# Patient Record
Sex: FEMALE | Race: ASIAN | NOT HISPANIC OR LATINO | URBAN - METROPOLITAN AREA
[De-identification: names, ages, dates, MRNs, and addresses within clinical notes are randomized per-mention and may not be internally consistent; named-entity substitution may affect disease eponyms.]

---

## 2019-10-23 ENCOUNTER — INPATIENT (INPATIENT)
Facility: HOSPITAL | Age: 59
LOS: 5 days | Discharge: HOME | End: 2019-10-29
Attending: HOSPITALIST | Admitting: HOSPITALIST
Payer: MEDICAID

## 2019-10-23 VITALS
OXYGEN SATURATION: 100 % | TEMPERATURE: 97 F | HEART RATE: 100 BPM | DIASTOLIC BLOOD PRESSURE: 62 MMHG | RESPIRATION RATE: 18 BRPM | SYSTOLIC BLOOD PRESSURE: 183 MMHG

## 2019-10-23 DIAGNOSIS — I50.33 ACUTE ON CHRONIC DIASTOLIC (CONGESTIVE) HEART FAILURE: ICD-10-CM

## 2019-10-23 DIAGNOSIS — R07.89 OTHER CHEST PAIN: ICD-10-CM

## 2019-10-23 DIAGNOSIS — Z98.890 OTHER SPECIFIED POSTPROCEDURAL STATES: Chronic | ICD-10-CM

## 2019-10-23 DIAGNOSIS — M79.602 PAIN IN LEFT ARM: ICD-10-CM

## 2019-10-23 DIAGNOSIS — I10 ESSENTIAL (PRIMARY) HYPERTENSION: ICD-10-CM

## 2019-10-23 DIAGNOSIS — I25.10 ATHEROSCLEROTIC HEART DISEASE OF NATIVE CORONARY ARTERY WITHOUT ANGINA PECTORIS: ICD-10-CM

## 2019-10-23 DIAGNOSIS — Z29.9 ENCOUNTER FOR PROPHYLACTIC MEASURES, UNSPECIFIED: ICD-10-CM

## 2019-10-23 DIAGNOSIS — E78.5 HYPERLIPIDEMIA, UNSPECIFIED: ICD-10-CM

## 2019-10-23 DIAGNOSIS — R07.9 CHEST PAIN, UNSPECIFIED: ICD-10-CM

## 2019-10-23 DIAGNOSIS — Z90.49 ACQUIRED ABSENCE OF OTHER SPECIFIED PARTS OF DIGESTIVE TRACT: Chronic | ICD-10-CM

## 2019-10-23 LAB
ALBUMIN SERPL ELPH-MCNC: 4.5 G/DL — SIGNIFICANT CHANGE UP (ref 3.3–5)
ALP SERPL-CCNC: 108 U/L — SIGNIFICANT CHANGE UP (ref 40–120)
ALT FLD-CCNC: 24 U/L — SIGNIFICANT CHANGE UP (ref 4–33)
ANION GAP SERPL CALC-SCNC: 14 MMO/L — SIGNIFICANT CHANGE UP (ref 7–14)
ANION GAP SERPL CALC-SCNC: 16 MMO/L — HIGH (ref 7–14)
APTT BLD: 45.9 SEC — HIGH (ref 27.5–36.3)
AST SERPL-CCNC: 28 U/L — SIGNIFICANT CHANGE UP (ref 4–32)
BASOPHILS # BLD AUTO: 0.05 K/UL — SIGNIFICANT CHANGE UP (ref 0–0.2)
BASOPHILS NFR BLD AUTO: 0.4 % — SIGNIFICANT CHANGE UP (ref 0–2)
BILIRUB SERPL-MCNC: 0.3 MG/DL — SIGNIFICANT CHANGE UP (ref 0.2–1.2)
BUN SERPL-MCNC: 12 MG/DL — SIGNIFICANT CHANGE UP (ref 7–23)
BUN SERPL-MCNC: 12 MG/DL — SIGNIFICANT CHANGE UP (ref 7–23)
CALCIUM SERPL-MCNC: 10.1 MG/DL — SIGNIFICANT CHANGE UP (ref 8.4–10.5)
CALCIUM SERPL-MCNC: 10.3 MG/DL — SIGNIFICANT CHANGE UP (ref 8.4–10.5)
CHLORIDE SERPL-SCNC: 103 MMOL/L — SIGNIFICANT CHANGE UP (ref 98–107)
CHLORIDE SERPL-SCNC: 103 MMOL/L — SIGNIFICANT CHANGE UP (ref 98–107)
CHOLEST SERPL-MCNC: 167 MG/DL — SIGNIFICANT CHANGE UP (ref 120–199)
CO2 SERPL-SCNC: 18 MMOL/L — LOW (ref 22–31)
CO2 SERPL-SCNC: 26 MMOL/L — SIGNIFICANT CHANGE UP (ref 22–31)
CREAT SERPL-MCNC: 0.78 MG/DL — SIGNIFICANT CHANGE UP (ref 0.5–1.3)
CREAT SERPL-MCNC: 0.81 MG/DL — SIGNIFICANT CHANGE UP (ref 0.5–1.3)
EOSINOPHIL # BLD AUTO: 0.32 K/UL — SIGNIFICANT CHANGE UP (ref 0–0.5)
EOSINOPHIL NFR BLD AUTO: 2.4 % — SIGNIFICANT CHANGE UP (ref 0–6)
GLUCOSE SERPL-MCNC: 117 MG/DL — HIGH (ref 70–99)
GLUCOSE SERPL-MCNC: 123 MG/DL — HIGH (ref 70–99)
HCT VFR BLD CALC: 45.8 % — HIGH (ref 34.5–45)
HDLC SERPL-MCNC: 43 MG/DL — LOW (ref 45–65)
HGB BLD-MCNC: 15.1 G/DL — SIGNIFICANT CHANGE UP (ref 11.5–15.5)
IMM GRANULOCYTES NFR BLD AUTO: 0.8 % — SIGNIFICANT CHANGE UP (ref 0–1.5)
INR BLD: 1 — SIGNIFICANT CHANGE UP (ref 0.88–1.17)
LIPID PNL WITH DIRECT LDL SERPL: 104 MG/DL — SIGNIFICANT CHANGE UP
LYMPHOCYTES # BLD AUTO: 24.6 % — SIGNIFICANT CHANGE UP (ref 13–44)
LYMPHOCYTES # BLD AUTO: 3.32 K/UL — HIGH (ref 1–3.3)
MAGNESIUM SERPL-MCNC: 1.9 MG/DL — SIGNIFICANT CHANGE UP (ref 1.6–2.6)
MCHC RBC-ENTMCNC: 29.8 PG — SIGNIFICANT CHANGE UP (ref 27–34)
MCHC RBC-ENTMCNC: 33 % — SIGNIFICANT CHANGE UP (ref 32–36)
MCV RBC AUTO: 90.5 FL — SIGNIFICANT CHANGE UP (ref 80–100)
MONOCYTES # BLD AUTO: 1.08 K/UL — HIGH (ref 0–0.9)
MONOCYTES NFR BLD AUTO: 8 % — SIGNIFICANT CHANGE UP (ref 2–14)
NEUTROPHILS # BLD AUTO: 8.61 K/UL — HIGH (ref 1.8–7.4)
NEUTROPHILS NFR BLD AUTO: 63.8 % — SIGNIFICANT CHANGE UP (ref 43–77)
NRBC # FLD: 0 K/UL — SIGNIFICANT CHANGE UP (ref 0–0)
PHOSPHATE SERPL-MCNC: 3 MG/DL — SIGNIFICANT CHANGE UP (ref 2.5–4.5)
PLATELET # BLD AUTO: 254 K/UL — SIGNIFICANT CHANGE UP (ref 150–400)
PMV BLD: 11.1 FL — SIGNIFICANT CHANGE UP (ref 7–13)
POTASSIUM SERPL-MCNC: 4.1 MMOL/L — SIGNIFICANT CHANGE UP (ref 3.5–5.3)
POTASSIUM SERPL-MCNC: 4.7 MMOL/L — SIGNIFICANT CHANGE UP (ref 3.5–5.3)
POTASSIUM SERPL-SCNC: 4.1 MMOL/L — SIGNIFICANT CHANGE UP (ref 3.5–5.3)
POTASSIUM SERPL-SCNC: 4.7 MMOL/L — SIGNIFICANT CHANGE UP (ref 3.5–5.3)
PROT SERPL-MCNC: 8.4 G/DL — HIGH (ref 6–8.3)
PROTHROM AB SERPL-ACNC: 11.4 SEC — SIGNIFICANT CHANGE UP (ref 9.8–13.1)
RBC # BLD: 5.06 M/UL — SIGNIFICANT CHANGE UP (ref 3.8–5.2)
RBC # FLD: 13.2 % — SIGNIFICANT CHANGE UP (ref 10.3–14.5)
SODIUM SERPL-SCNC: 137 MMOL/L — SIGNIFICANT CHANGE UP (ref 135–145)
SODIUM SERPL-SCNC: 143 MMOL/L — SIGNIFICANT CHANGE UP (ref 135–145)
TRIGL SERPL-MCNC: 173 MG/DL — HIGH (ref 10–149)
TROPONIN T, HIGH SENSITIVITY: 12 NG/L — SIGNIFICANT CHANGE UP (ref ?–14)
TROPONIN T, HIGH SENSITIVITY: 13 NG/L — SIGNIFICANT CHANGE UP (ref ?–14)
WBC # BLD: 13.49 K/UL — HIGH (ref 3.8–10.5)
WBC # FLD AUTO: 13.49 K/UL — HIGH (ref 3.8–10.5)

## 2019-10-23 PROCEDURE — 71046 X-RAY EXAM CHEST 2 VIEWS: CPT | Mod: 26

## 2019-10-23 PROCEDURE — 93010 ELECTROCARDIOGRAM REPORT: CPT

## 2019-10-23 PROCEDURE — 93571 IV DOP VEL&/PRESS C FLO 1ST: CPT | Mod: 26,LC

## 2019-10-23 PROCEDURE — 99233 SBSQ HOSP IP/OBS HIGH 50: CPT

## 2019-10-23 PROCEDURE — 99152 MOD SED SAME PHYS/QHP 5/>YRS: CPT

## 2019-10-23 PROCEDURE — 93458 L HRT ARTERY/VENTRICLE ANGIO: CPT | Mod: 26

## 2019-10-23 RX ORDER — CLONAZEPAM 1 MG
1 TABLET ORAL
Qty: 0 | Refills: 0 | DISCHARGE

## 2019-10-23 RX ORDER — ASPIRIN/CALCIUM CARB/MAGNESIUM 324 MG
162 TABLET ORAL ONCE
Refills: 0 | Status: COMPLETED | OUTPATIENT
Start: 2019-10-23 | End: 2019-10-23

## 2019-10-23 RX ORDER — ACETAMINOPHEN 500 MG
650 TABLET ORAL EVERY 6 HOURS
Refills: 0 | Status: DISCONTINUED | OUTPATIENT
Start: 2019-10-23 | End: 2019-10-29

## 2019-10-23 RX ORDER — ASPIRIN/CALCIUM CARB/MAGNESIUM 324 MG
1 TABLET ORAL
Qty: 0 | Refills: 0 | DISCHARGE

## 2019-10-23 RX ORDER — INFLUENZA VIRUS VACCINE 15; 15; 15; 15 UG/.5ML; UG/.5ML; UG/.5ML; UG/.5ML
0.5 SUSPENSION INTRAMUSCULAR ONCE
Refills: 0 | Status: DISCONTINUED | OUTPATIENT
Start: 2019-10-23 | End: 2019-10-29

## 2019-10-23 RX ORDER — ATORVASTATIN CALCIUM 80 MG/1
20 TABLET, FILM COATED ORAL AT BEDTIME
Refills: 0 | Status: DISCONTINUED | OUTPATIENT
Start: 2019-10-23 | End: 2019-10-24

## 2019-10-23 RX ORDER — ASPIRIN/CALCIUM CARB/MAGNESIUM 324 MG
81 TABLET ORAL DAILY
Refills: 0 | Status: DISCONTINUED | OUTPATIENT
Start: 2019-10-24 | End: 2019-10-29

## 2019-10-23 RX ORDER — TRIAMTERENE/HYDROCHLOROTHIAZID 75 MG-50MG
1 TABLET ORAL DAILY
Refills: 0 | Status: DISCONTINUED | OUTPATIENT
Start: 2019-10-23 | End: 2019-10-29

## 2019-10-23 RX ORDER — CLONAZEPAM 1 MG
0.5 TABLET ORAL AT BEDTIME
Refills: 0 | Status: DISCONTINUED | OUTPATIENT
Start: 2019-10-23 | End: 2019-10-29

## 2019-10-23 RX ADMIN — Medication 0.5 MILLIGRAM(S): at 22:59

## 2019-10-23 RX ADMIN — ATORVASTATIN CALCIUM 20 MILLIGRAM(S): 80 TABLET, FILM COATED ORAL at 22:59

## 2019-10-23 RX ADMIN — Medication 162 MILLIGRAM(S): at 14:37

## 2019-10-23 RX ADMIN — Medication 650 MILLIGRAM(S): at 23:15

## 2019-10-23 RX ADMIN — Medication 650 MILLIGRAM(S): at 22:26

## 2019-10-23 NOTE — H&P ADULT - PROBLEM SELECTOR PLAN 5
ECHO in January 2019= mild diastolic dysfunction  Continue outpatient medications ASA  Strict I&Os, monitor weight DVT PPX  Pt on ASA for CAD, and diastolic CHF  DASH Diet  Continue clonazepam for insomnia

## 2019-10-23 NOTE — ED PROVIDER NOTE - PROGRESS NOTE DETAILS
PHANI Harper: Spoke with , the fellow will be evaluating the patient PHANI Harper: pt seen by EP, plan to take her to cath lab, no further medications need to be adminstered in the ED, admit to  PHANI Harper: pt seen by EP, plan to take her to cath lab, no further medications need to be administered in the ED, admit to

## 2019-10-23 NOTE — ED PROVIDER NOTE - ATTENDING CONTRIBUTION TO CARE
Patient presents to the ed with cp and abnl stress. Patient developed cp few d ago, left side, radiated to lue, tightness, nonexertional, lasted few hours/resolved. Went to md who did stress test and was found to be abnormal with possible runs of v-tach per paperwork. Patient was told to go to ed yesterday but was nervous so waited until today. Patient denies any symptoms at this time, feels at her baseline soh. No fevers, chills, ha, sob, abd pain, vomiting, diarrhea, dysuria, le edema, hormone use. Has 6 siblings with h/o mi.   exam  GEN - NAD; well appearing; A+O x3   HEAD - NC/AT   EYES- PERRL, EOMI  ENT: Airway patent, mmm, Oral cavity and pharynx normal.    NECK: Neck supple  PULMONARY - CTA b/l, symmetric breath sounds.   CARDIAC -s1s2, RRR, no M,G,R  ABDOMEN - +BS, ND, NT, soft, no guarding  BACK - no CVA tenderness, Normal  spine   EXTREMITIES - FROM, no edema   SKIN - no rash or bruising   NEUROLOGIC - alert, speech clear, no focal deficits  PSYCH -nl mood/affect, nl insight.  a/p-patient presents with ep of chest pain, found to have abnl stress as outpatient, no symptoms in ed, vss, plan for labs to eval for acs, ekg reviewed with no acute ischemia, cardiology called, admit for further management.

## 2019-10-23 NOTE — H&P ADULT - NEGATIVE GENERAL SYMPTOMS
no malaise/no polyphagia/no polyuria/no polydipsia/no fever/no fatigue/no weight loss/no chills/no sweating/no anorexia

## 2019-10-23 NOTE — ED PROVIDER NOTE - CLINICAL SUMMARY MEDICAL DECISION MAKING FREE TEXT BOX
59yF w/pmhx HTN?, HLD sent to ED with left sided chest pain and positive stress test. Pt with script to call , will discuss case with him. Will check EKG, CXR, cbc/cmp/trop, ASA. Will monitor on tele

## 2019-10-23 NOTE — H&P ADULT - NEGATIVE CARDIOVASCULAR SYMPTOMS
no paroxysmal nocturnal dyspnea/no chest pain/no peripheral edema/no palpitations/no dyspnea on exertion/no orthopnea/no claudication

## 2019-10-23 NOTE — H&P ADULT - NSICDXFAMILYHX_GEN_ALL_CORE_FT
FAMILY HISTORY:  Family history of myocardial infarction,   in ; Sister  in ; 6 brother  of MI around ages 45-49 y/o

## 2019-10-23 NOTE — H&P ADULT - NEGATIVE OPHTHALMOLOGIC SYMPTOMS
no diplopia/no photophobia/no blurred vision R/no discharge R/no pain L/no irritation L/no irritation R/no loss of vision R/no lacrimation L/no lacrimation R/no discharge L/no blurred vision L/no scleral injection R/no pain R/no loss of vision L/no scleral injection L

## 2019-10-23 NOTE — H&P ADULT - RS GEN PE MLT RESP DETAILS PC
no intercostal retractions/no rales/airway patent/respirations non-labored/normal/no wheezes/no chest wall tenderness/breath sounds equal/good air movement/no rhonchi/no subcutaneous emphysema/clear to auscultation bilaterally

## 2019-10-23 NOTE — H&P ADULT - NSICDXPASTSURGICALHX_GEN_ALL_CORE_FT
PAST SURGICAL HISTORY:  H/O eye surgery     S/P cholecystectomy PAST SURGICAL HISTORY:  H/O eye surgery Left eye for cataracts; July 2019    S/P cholecystectomy 20 years ago

## 2019-10-23 NOTE — H&P ADULT - MUSCULOSKELETAL
detailed exam diminished strength/normal/no joint swelling/no joint erythema/no joint warmth/no calf tenderness/ROM intact/diminished strength in Left arm and hand details… no joint swelling/no joint erythema/ROM intact/normal/no joint warmth/no calf tenderness

## 2019-10-23 NOTE — H&P ADULT - NEUROLOGICAL DETAILS
responds to pain/normal strength/alert and oriented x 3/cranial nerves intact/no spontaneous movement/superficial reflexes intact/responds to verbal commands/sensation intact/deep reflexes intact

## 2019-10-23 NOTE — H&P ADULT - NEGATIVE GASTROINTESTINAL SYMPTOMS
no abdominal pain/no melena/no vomiting/no constipation/no diarrhea/no change in bowel habits/no flatulence/no steatorrhea/no jaundice/no hematochezia/no hiccoughs/no nausea

## 2019-10-23 NOTE — H&P ADULT - PROBLEM SELECTOR PLAN 1
Telemetry monitor cardiac catheterization with coronary angiogram  CE x 3, telemetry , serial EKG's   Unstable angina   Serial EKG's  Serial CE: Troponin I, CK, CKMB, Pro-BNP  Continue ASA. O2. consider BB/imdur  case dw Dr Vo and cardiology fellow

## 2019-10-23 NOTE — H&P ADULT - ASSESSMENT
60 y/o female with PMHx of HTN, HLD, diastolic CHF and non-obstructive CAD, p/w left sided chest heaviness and Lt arm discomfort., admitted to telemetry for a CTA to R/O ACS.     EKG: NSR @ 106BPM; LVH

## 2019-10-23 NOTE — ED ADULT NURSE NOTE - OBJECTIVE STATEMENT
Pt rec'd in 23, brought in by niece who is translating for pt per request (Sinhala-speaking). Pt is visiting from [facility], [place] ago, c/o left sided chest and left arm pain x 2 days. Pt was taken to an outpatient doctor who did an EKG at rest and EKG while jogging. Pt became dizzy and had runs of V-tach. Pt was told to come to ED, but didn't come yesterday because she felt scared. Pt with history of HTN, DM, and high cholesterol, denies personal cardiac history, but has significant family history (multiple brothers dead from heart attacks). Pt reports feeling dizzy when she walks around. Reports mild chest pain at this time, appears in NAD.

## 2019-10-23 NOTE — CHART NOTE - NSCHARTNOTEFT_GEN_A_CORE
Right radial wrist band removed. Hemostasis achieved. Site soft without hematoma or bleeding. DSD applied. Pulses positive. Pt hemodynamically stable. Monitoring of site discussed with patient, family and RN.

## 2019-10-23 NOTE — H&P ADULT - NEGATIVE ENMT SYMPTOMS
no hearing difficulty/no ear pain/no sinus symptoms/no nasal congestion/no nose bleeds/no recurrent cold sores/no dry mouth/no vertigo/no nasal discharge/no tinnitus/no throat pain/no dysphagia/no post-nasal discharge/no gum bleeding/no abnormal taste sensation/no nasal obstruction

## 2019-10-23 NOTE — H&P ADULT - NEGATIVE PSYCHIATRIC SYMPTOMS
no memory loss/no mood swings/no hyperactivity/no suicidal ideation/no agitation/no auditory hallucinations/no depression/no anxiety/no paranoia/no visual hallucinations

## 2019-10-23 NOTE — H&P ADULT - NEGATIVE NEUROLOGICAL SYMPTOMS
no focal seizures/no loss of sensation/no difficulty walking/no facial palsy/no transient paralysis/no generalized seizures/no loss of consciousness/no hemiparesis/no headache/no confusion/no vertigo/no weakness/no syncope/no tremors/no paresthesias

## 2019-10-23 NOTE — H&P ADULT - NSHPSOCIALHISTORY_GEN_ALL_CORE
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 [place]. Pt is ,  passed away in 2009 of an MI. Pt denies tobacco, EtOH, and illicit drug use at this time.

## 2019-10-23 NOTE — ED ADULT NURSE NOTE - CHIEF COMPLAINT QUOTE
Pt visiting from [facility] c/o Lt sided chest pain, intermittent, starting 2 days ago radiating to her Lt arm, pt had EKG done yesterday and was told to come to ED for evaluation of v. tach and to have a CTA pt was nervous and did not seek medical treatment, pt denies headache/dizziness, no sob, reports discomfort to lt arm.

## 2019-10-23 NOTE — H&P ADULT - NEGATIVE MUSCULOSKELETAL SYMPTOMS
no muscle cramps/no joint swelling/no myalgia/no arthralgia/no arthritis/no back pain/no leg pain L/no leg pain R/no stiffness/no arm pain R

## 2019-10-23 NOTE — ED ADULT TRIAGE NOTE - CHIEF COMPLAINT QUOTE
Pt visiting from [facility] c/o Lt sided chest pain, intermittent, starting 2 days ago radiating to her Lt arm, pt had EKG done yesterday and was told to come to ED for evaluation of v. tach and to have a CTA pt was nervous and did not seek medical treatment, pt denies headache/dizziness, no sob, reports discomfort to lt arm.

## 2019-10-23 NOTE — H&P ADULT - HISTORY OF PRESENT ILLNESS
NO chest pain, SOB, POOL, PND, orthopnea, palpitations, diaphoresis, lightheadedness, dizziness, syncope, increased lower extremity edema, fever chills, malaise, myalgias, anorexia, weight changes ( loss or gain), night sweats, generalized fatigue abdominal pain, N/V/C/D BRBPR, melena, urinary symptoms, cough, and wheezing. 60 y/o female with PMHx of HTN, HLD, diastolic CHF and non-obstructive CAD, p/w left sided chest heaviness and Lt arm discomfort. Pt states she arrived from [facility] 6 days ago. Pt reports left sided chest/shoulder heaviness with radiation to the left arm, non-exertional, non-positional, and not reproducible at this time. Pt reveals she went to the doctor yesterday and had a stress test, which came back positive for runs of ventricular tachycardia. Pt reports increased left arm pain x 1 wk, and weakness in her left hand. Pt describes the left arm discomfort as tightness and weakness down to her fingertips, and rates the pain a 4/10 at this time. Pt reports the left chest heaviness and left arm pain comes and goes and keeps her up at night sometimes d/t the discomfort. Pt endorses the left arm pain is worse with movement and exertion, but denies pain with palpation. Pt denies numbness and tingling in the left arm at this time. Pt endorses unintentional weight gain, around 20 lbs within the last year. Pt denies chest pain, SOB, POOL, PND, orthopnea, palpitations, diaphoresis, lightheadedness, dizziness, syncope, LE edema, fever/chills, malaise, myalgias, anorexia, night sweats, generalized fatigue, abdominal pain, N/V/C/D, BRBPR, melena, urinary symptoms, cough, and wheezing at this time. Denies prolonged bed rest, and recent sick contacts at this time. 60 y/o female with PMHx of HTN, HLD, diastolic CHF and non-obstructive CAD, p/w left sided chest heaviness and Lt arm discomfort. Pt states she arrived from [facility] 6 days ago. Pt reports left sided chest/shoulder heaviness 3/10 with radiation to the left arm, non-exertional, non-positional, and not reproducible at this time lasting a few seconds. Pt reveals she went to the doctor yesterday and had a stress test, which came back positive for runs of ventricular tachycardia. Pt reports increased left arm pain x 1 wk, and weakness in her left hand. Pt describes the left arm discomfort as tightness and weakness down to her fingertips, and rates the pain a 4/10 at this time. Pt reports the left chest heaviness and left arm pain comes and goes and keeps her up at night sometimes d/t the discomfort. Pt endorses the left arm pain is worse with movement and exertion, but denies pain with palpation. Pt denies numbness and tingling in the left arm at this time. Pt endorses unintentional weight gain, around 20 lbs within the last year. Pt denies chest pain, SOB, POOL, PND, orthopnea, palpitations, diaphoresis, lightheadedness, dizziness, syncope, LE edema, fever/chills, malaise, myalgias, anorexia, night sweats, generalized fatigue, abdominal pain, N/V/C/D, BRBPR, melena, urinary symptoms, cough, and wheezing at this time. Denies prolonged bed rest, and recent sick contacts at this time.

## 2019-10-23 NOTE — H&P ADULT - GASTROINTESTINAL DETAILS
no rebound tenderness/no rigidity/no organomegaly/soft/no masses palpable/no guarding/normal/bowel sounds normal/no bruit/nontender/no distention

## 2019-10-23 NOTE — H&P ADULT - PROBLEM SELECTOR PLAN 2
CTA  Serial EKG's  Serial CE: Troponin I, CK, CKMB, Pro-BNP  Continue ASA Monitor BP regularly  low salt, low fat, low cholesterol   continue triameterene HCTZ

## 2019-10-23 NOTE — H&P ADULT - NSHPPOAURINARYCATHETER_GEN_ALL_CORE
"[facility]  [address] [facility]  [address]  [place]  [address]  Tel: [phone]    June 19, 2017    Lima Bueno  [address]  [place] [address]        Dear Ms. Chris Bueno,    We missed doing your Asthma Control Test when you were in for your recent appointment.     Please complete the enclosed \"ACT\"and either     mail back to us  or  fax [phone] back to us  or  call [phone] us with your answers (ask for any of the nurses/ med assist).     Due to copyright laws we are unable to ask you the questions over the phone without the form visible to you.         Sincerely,    Nereyda ALVAREZ MA on behalf of Oscar Baltazar MD        Enc: ACT form      "
no

## 2019-10-23 NOTE — H&P ADULT - NEGATIVE GENERAL GENITOURINARY SYMPTOMS
no renal colic/no hematuria/no flank pain R/no urine discoloration/no dysuria/no incontinence/no urinary hesitancy/no nocturia/no flank pain L/normal libido/no gas in urine/normal urinary frequency/no bladder infections

## 2019-10-23 NOTE — CONSULT NOTE ADULT - ATTENDING COMMENTS
59F with HTN, HLD presents for wide complex tachycardia during stress test. She is here for cor angiogram and further discussion of possible EP testing.

## 2019-10-23 NOTE — CONSULT NOTE ADULT - SUBJECTIVE AND OBJECTIVE BOX
For all Cardiology service contact information, go to [email or web address] and use "[email or web address]" to login.      Chief Complaint: left arm pain    HPI:  59F with HTN, HLD presents for abnormal stress test. She says that she began having left arm pain a few months ago that was worse with exertion. She denies pain radiating to the chest or back. Also denies SOB. She performed an exercise stress test on 10/24, where she had polymorphic VT with arm pain and dizziness. She says that she fell off the treadmill but the providers there managed to catch her.     Pt arrived from [facility] 6 days ago. She claims that 5 of her brothers have  suddenly. She had a LHC in [facility] in  that showed diffuse disease in the LAD.       PMH:   HLD (hyperlipidemia)      PSH:   No significant past surgical history      Medications:   Reviewed    Allergies:  No Known Allergies      FAMILY HISTORY:  Brothers with SCD    Social History:  Smoking History: denies  Alcohol Use: denies  Drug Use: denies    Review of Systems:  REVIEW OF SYSTEMS:  CONSTITUTIONAL: No weakness, fevers or chills  EYES/ENT: No visual changes;  No dysphagia  NECK: No pain or stiffness  RESPIRATORY: No cough, wheezing, hemoptysis; No shortness of breath  CARDIOVASCULAR: No chest pain or palpitations; No lower extremity edema  GASTROINTESTINAL: No abdominal or epigastric pain. No nausea, vomiting, or hematemesis; No diarrhea or constipation. No melena or hematochezia.  BACK: No back pain  GENITOURINARY: No dysuria, frequency or hematuria  NEUROLOGICAL: No numbness or weakness  Extremities: +arm pain  All other review of systems is negative unless indicated above.    Physical Exam:  T(F): 98 (10-), Max: 98 (10-)  HR: 107 (10-) (100 - 107)  BP: 201/66 (10-) (183/62 - 201/66)  RR: 18 (10-)  SpO2: 100% (10-)  GENERAL: No acute distress, well-developed  HEAD:  Atraumatic, Normocephalic  ENT: EOMI, PERRLA, conjunctiva and sclera clear, Neck supple, No JVD, moist mucosa  CHEST/LUNG: Clear to auscultation bilaterally; No wheeze, equal breath sounds bilaterally   HEART: Regular rate and rhythm; No murmurs, rubs, or gallops  ABDOMEN: Soft, Nontender, Nondistended; Bowel sounds present  EXTREMITIES:  No clubbing, cyanosis, or edema  PSYCH: Nl behavior, nl affect  NEUROLOGY: AAOx3, non-focal, cranial nerves intact    Cardiovascular Diagnostic Testing:    ECG: Personally reviewed  ST, , LVH with repol abnormalities    Labs: Personally reviewed                        15.1   13.49 )-----------( 254      ( 23 Oct 2019 13:40 )             45.8     10    137  |  103  |  12  ----------------------------<  117<H>  4.1   |  18<L>  |  0.78    Ca    10.1      23 Oct 2019 13:40    TPro  8.4<H>  /  Alb  4.5  /  TBili  0.3  /  DBili  x   /  AST  28  /  ALT  24  /  AlkPhos  108  10-    PT/INR - ( 23 Oct 2019 13:40 )   PT: 11.4 SEC;   INR: 1.00          PTT - ( 23 Oct 2019 13:40 )  PTT:45.9 SEC

## 2019-10-23 NOTE — ED PROVIDER NOTE - OBJECTIVE STATEMENT
59yF w/pmhx HTN?, HLD sent to ED with left sided chest pain and positive stress test. Pt arrived from [facility] 6 days ago. Two days ago she developed left sided chest pain with radiation to the left arm , non-exertional. Pt went to a doctor yesterday and had a stress test performed, reported as positive with runs of Vtach? She was instructed to come to the ED yesterday but was nervous to do so. Pt denies palpitations, shortness of breath, near syncope, back pain, abd pain, n/v/d, headache, dizziness, leg pain or swelling or any other concerns.  Of note, pts 6 brothers have all had fatal MIs  Family member providing interpretation per pt request: Brandon Car

## 2019-10-23 NOTE — H&P ADULT - NSICDXPASTMEDICALHX_GEN_ALL_CORE_FT
PAST MEDICAL HISTORY:  CAD in native artery     CHF, chronic diastolic    HLD (hyperlipidemia)     HTN (hypertension)

## 2019-10-23 NOTE — H&P ADULT - MS GEN HX ROS MEA POS PC
muscle weakness/arm pain L/left arm discomfort from shoulder to fingertips; weakness in hands x 1 week

## 2019-10-24 LAB
ALBUMIN SERPL ELPH-MCNC: 4 G/DL — SIGNIFICANT CHANGE UP (ref 3.3–5)
ALP SERPL-CCNC: 90 U/L — SIGNIFICANT CHANGE UP (ref 40–120)
ALT FLD-CCNC: 19 U/L — SIGNIFICANT CHANGE UP (ref 4–33)
ANION GAP SERPL CALC-SCNC: 13 MMO/L — SIGNIFICANT CHANGE UP (ref 7–14)
AST SERPL-CCNC: 24 U/L — SIGNIFICANT CHANGE UP (ref 4–32)
BASOPHILS # BLD AUTO: 0.04 K/UL — SIGNIFICANT CHANGE UP (ref 0–0.2)
BASOPHILS NFR BLD AUTO: 0.4 % — SIGNIFICANT CHANGE UP (ref 0–2)
BILIRUB DIRECT SERPL-MCNC: < 0.2 MG/DL — SIGNIFICANT CHANGE UP (ref 0.1–0.2)
BILIRUB SERPL-MCNC: 0.4 MG/DL — SIGNIFICANT CHANGE UP (ref 0.2–1.2)
BUN SERPL-MCNC: 12 MG/DL — SIGNIFICANT CHANGE UP (ref 7–23)
CALCIUM SERPL-MCNC: 9.5 MG/DL — SIGNIFICANT CHANGE UP (ref 8.4–10.5)
CHLORIDE SERPL-SCNC: 103 MMOL/L — SIGNIFICANT CHANGE UP (ref 98–107)
CHOLEST SERPL-MCNC: 131 MG/DL — SIGNIFICANT CHANGE UP (ref 120–199)
CO2 SERPL-SCNC: 24 MMOL/L — SIGNIFICANT CHANGE UP (ref 22–31)
CREAT SERPL-MCNC: 0.73 MG/DL — SIGNIFICANT CHANGE UP (ref 0.5–1.3)
EOSINOPHIL # BLD AUTO: 0.49 K/UL — SIGNIFICANT CHANGE UP (ref 0–0.5)
EOSINOPHIL NFR BLD AUTO: 5.3 % — SIGNIFICANT CHANGE UP (ref 0–6)
GLUCOSE SERPL-MCNC: 129 MG/DL — HIGH (ref 70–99)
HAV IGM SER-ACNC: NONREACTIVE — SIGNIFICANT CHANGE UP
HBA1C BLD-MCNC: 6 % — HIGH (ref 4–5.6)
HBV CORE IGM SER-ACNC: NONREACTIVE — SIGNIFICANT CHANGE UP
HBV SURFACE AG SER-ACNC: NONREACTIVE — SIGNIFICANT CHANGE UP
HCT VFR BLD CALC: 41.6 % — SIGNIFICANT CHANGE UP (ref 34.5–45)
HCV AB S/CO SERPL IA: 0.09 S/CO — SIGNIFICANT CHANGE UP (ref 0–0.99)
HCV AB SERPL-IMP: SIGNIFICANT CHANGE UP
HDLC SERPL-MCNC: 35 MG/DL — LOW (ref 45–65)
HGB BLD-MCNC: 13.6 G/DL — SIGNIFICANT CHANGE UP (ref 11.5–15.5)
IMM GRANULOCYTES NFR BLD AUTO: 0.4 % — SIGNIFICANT CHANGE UP (ref 0–1.5)
LIPID PNL WITH DIRECT LDL SERPL: 82 MG/DL — SIGNIFICANT CHANGE UP
LYMPHOCYTES # BLD AUTO: 2.11 K/UL — SIGNIFICANT CHANGE UP (ref 1–3.3)
LYMPHOCYTES # BLD AUTO: 22.9 % — SIGNIFICANT CHANGE UP (ref 13–44)
MAGNESIUM SERPL-MCNC: 1.9 MG/DL — SIGNIFICANT CHANGE UP (ref 1.6–2.6)
MCHC RBC-ENTMCNC: 29.4 PG — SIGNIFICANT CHANGE UP (ref 27–34)
MCHC RBC-ENTMCNC: 32.7 % — SIGNIFICANT CHANGE UP (ref 32–36)
MCV RBC AUTO: 90 FL — SIGNIFICANT CHANGE UP (ref 80–100)
MONOCYTES # BLD AUTO: 0.83 K/UL — SIGNIFICANT CHANGE UP (ref 0–0.9)
MONOCYTES NFR BLD AUTO: 9 % — SIGNIFICANT CHANGE UP (ref 2–14)
NEUTROPHILS # BLD AUTO: 5.72 K/UL — SIGNIFICANT CHANGE UP (ref 1.8–7.4)
NEUTROPHILS NFR BLD AUTO: 62 % — SIGNIFICANT CHANGE UP (ref 43–77)
NRBC # FLD: 0 K/UL — SIGNIFICANT CHANGE UP (ref 0–0)
PHOSPHATE SERPL-MCNC: 3.7 MG/DL — SIGNIFICANT CHANGE UP (ref 2.5–4.5)
PLATELET # BLD AUTO: 265 K/UL — SIGNIFICANT CHANGE UP (ref 150–400)
PMV BLD: 11.1 FL — SIGNIFICANT CHANGE UP (ref 7–13)
POTASSIUM SERPL-MCNC: 4.1 MMOL/L — SIGNIFICANT CHANGE UP (ref 3.5–5.3)
POTASSIUM SERPL-SCNC: 4.1 MMOL/L — SIGNIFICANT CHANGE UP (ref 3.5–5.3)
PROT SERPL-MCNC: 7.4 G/DL — SIGNIFICANT CHANGE UP (ref 6–8.3)
RBC # BLD: 4.62 M/UL — SIGNIFICANT CHANGE UP (ref 3.8–5.2)
RBC # FLD: 13.1 % — SIGNIFICANT CHANGE UP (ref 10.3–14.5)
SODIUM SERPL-SCNC: 140 MMOL/L — SIGNIFICANT CHANGE UP (ref 135–145)
TRIGL SERPL-MCNC: 138 MG/DL — SIGNIFICANT CHANGE UP (ref 10–149)
WBC # BLD: 9.23 K/UL — SIGNIFICANT CHANGE UP (ref 3.8–10.5)
WBC # FLD AUTO: 9.23 K/UL — SIGNIFICANT CHANGE UP (ref 3.8–10.5)

## 2019-10-24 PROCEDURE — 99232 SBSQ HOSP IP/OBS MODERATE 35: CPT

## 2019-10-24 RX ORDER — ATORVASTATIN CALCIUM 80 MG/1
80 TABLET, FILM COATED ORAL AT BEDTIME
Refills: 0 | Status: DISCONTINUED | OUTPATIENT
Start: 2019-10-24 | End: 2019-10-29

## 2019-10-24 RX ADMIN — Medication 650 MILLIGRAM(S): at 20:23

## 2019-10-24 RX ADMIN — Medication 1 TABLET(S): at 06:36

## 2019-10-24 RX ADMIN — Medication 0.5 MILLIGRAM(S): at 22:22

## 2019-10-24 RX ADMIN — ATORVASTATIN CALCIUM 80 MILLIGRAM(S): 80 TABLET, FILM COATED ORAL at 22:22

## 2019-10-24 RX ADMIN — Medication 81 MILLIGRAM(S): at 12:21

## 2019-10-24 RX ADMIN — Medication 650 MILLIGRAM(S): at 21:00

## 2019-10-24 NOTE — PROGRESS NOTE ADULT - ASSESSMENT
58 y/o female with PMHx of HTN, HLD, diastolic CHF and non-obstructive CAD, p/w left sided chest heaviness and Lt arm discomfort., admitted to telemetry for a CTA to R/O ACS.   s/p Stress with VT  s/p LHC with 50% prox Circ disease, iFR negative, plan for medical management    Plan:  Cont ASA 81  Increase atorva to 80  Consider BB, will defer to EP    For all cardiology related questions, please call the current cardiology fellow on service at this time.  ** Please go to [email or web address] ; login: ded (case-sensitive) **    Yohan Vergara MD  Department of Cardiovascular Disease

## 2019-10-24 NOTE — PROGRESS NOTE ADULT - SUBJECTIVE AND OBJECTIVE BOX
Patient seen and examined at bedside.    Overnight Events:   No events overnight  Denied CP or SOB  Tele without any ectopy overnight.     REVIEW OF SYSTEMS:  CONSTITUTIONAL: No weakness, fevers or chills  EYES/ENT: No visual changes;  No dysphagia  NECK: No pain or stiffness  RESPIRATORY: No cough, wheezing, hemoptysis; No shortness of breath  CARDIOVASCULAR: No chest pain or palpitations; No lower extremity edema  GASTROINTESTINAL: No abdominal or epigastric pain. No nausea, vomiting, or hematemesis; No diarrhea or constipation. No melena or hematochezia.  BACK: No back pain  GENITOURINARY: No dysuria, frequency or hematuria  NEUROLOGICAL: No numbness or weakness  SKIN: No itching, burning, rashes, or lesions   All other review of systems is negative unless indicated above.            Current Meds:  acetaminophen   Tablet .. 650 milliGRAM(s) Oral every 6 hours PRN  aspirin enteric coated 81 milliGRAM(s) Oral daily  atorvastatin 20 milliGRAM(s) Oral at bedtime  clonazePAM  Tablet 0.5 milliGRAM(s) Oral at bedtime  influenza   Vaccine 0.5 milliLiter(s) IntraMuscular once  triamterene 37.5 mG/hydrochlorothiazide 25 mG Tablet 1 Tablet(s) Oral daily    Vitals:  T(F): 97 (10-), Max: 98.8 (10-)  HR: 72 (10-) (72 - 107)  BP: 123/68 (10-) (123/68 - 201/66)  RR: 18 (10-)  SpO2: 99% (10-24)  I&O's Summary    23 Oct 2019 07:  -  24 Oct 2019 07:00  --------------------------------------------------------  IN: 600 mL / OUT: 800 mL / NET: -200 mL    24 Oct 2019 07:  -  24 Oct 2019 10:25  --------------------------------------------------------  IN: 800 mL / OUT: 0 mL / NET: 800 mL      Physical Exam:  Appearance: No acute distress; well appearing  Eyes: PERRL, EOMI, pink conjunctiva  HENT: Normal oral mucosa  Cardiovascular: RRR, S1, S2, no murmurs, rubs, or gallops; no edema; no JVD  Respiratory: Clear to auscultation bilaterally  Gastrointestinal: soft, non-tender, non-distended with normal bowel sounds  Musculoskeletal: No clubbing; no joint deformity. Right radial C/D/i  Neurologic: Non-focal  Lymphatic: No lymphadenopathy  Psychiatry: AAOx3, mood & affect appropriate  Skin: No rashes, ecchymoses, or cyanosis                          13.6   9.23  )-----------( 265      ( 24 Oct 2019 06:21 )             41.6     10-24    140  |  103  |  12  ----------------------------<  129<H>  4.1   |  24  |  0.73    Ca    9.5      24 Oct 2019 06:21  Phos  3.7     10-24  Mg     1.9     10-24    TPro  7.4  /  Alb  4.0  /  TBili  0.4  /  DBili  < 0.2  /  AST  24  /  ALT  19  /  AlkPhos  90  10-24    PT/INR - ( 23 Oct 2019 13:40 )   PT: 11.4 SEC;   INR: 1.00          PTT - ( 23 Oct 2019 13:40 )  PTT:45.9 SEC        Total Cholesterol: 131  LDL: 82  HDL: 35  T  Total Cholesterol: 167  LDL: 104  HDL: 43  T    Hemoglobin A1C, Whole Blood: 6.0 % (10-24 @ 06:21)

## 2019-10-24 NOTE — PROGRESS NOTE ADULT - SUBJECTIVE AND OBJECTIVE BOX
Patient seen and examined at bedside.    Overnight Events: No events. Pt had LHC yest, w/ noted non-obstructive lesion in LCx w/ normal IFR. This AM, pt denies any palpitations. Review of tele reveals NSR w/o VT.    Review Of Systems: No chest pain, shortness of breath, or palpitations            Current Meds:  acetaminophen   Tablet .. 650 milliGRAM(s) Oral every 6 hours PRN  aspirin enteric coated 81 milliGRAM(s) Oral daily  atorvastatin 20 milliGRAM(s) Oral at bedtime  clonazePAM  Tablet 0.5 milliGRAM(s) Oral at bedtime  influenza   Vaccine 0.5 milliLiter(s) IntraMuscular once  triamterene 37.5 mG/hydrochlorothiazide 25 mG Tablet 1 Tablet(s) Oral daily      Vitals:  T(F): 97 (10-24), Max: 98.8 (10-23)  HR: 72 (10-24) (72 - 107)  BP: 123/68 (10-24) (123/68 - 201/66)  RR: 18 (10-24)  SpO2: 99% (10-24)  I&O's Summary    23 Oct 2019 07:  -  24 Oct 2019 07:00  --------------------------------------------------------  IN: 600 mL / OUT: 800 mL / NET: -200 mL    24 Oct 2019 07:  -  24 Oct 2019 10:45  --------------------------------------------------------  IN: 800 mL / OUT: 0 mL / NET: 800 mL        Physical Exam:  Gen: NAD.  HEENT: NCAT. PERRLA b/l.  Neck: No JVP elev.  CV: Normal S1, S2. RRR. No MRG.  Chest: CTAB. No WRR.  Abd: +BSx4. Soft. NTND.  Ext: No LE edema.  Skin: No cyanosis.                          13.6   9.23  )-----------( 265      ( 24 Oct 2019 06:21 )             41.6     10-24    140  |  103  |  12  ----------------------------<  129<H>  4.1   |  24  |  0.73    Ca    9.5      24 Oct 2019 06:21  Phos  3.7     10-24  Mg     1.9     10-24    TPro  7.4  /  Alb  4.0  /  TBili  0.4  /  DBili  < 0.2  /  AST  24  /  ALT  19  /  AlkPhos  90  10-24    PT/INR - ( 23 Oct 2019 13:40 )   PT: 11.4 SEC;   INR: 1.00          PTT - ( 23 Oct 2019 13:40 )  PTT:45.9 SEC        Total Cholesterol: 131  LDL: 82  HDL: 35  T  Total Cholesterol: 167  LDL: 104  HDL: 43  T    Hemoglobin A1C, Whole Blood: 6.0 % (10-24 @ 06:21)      New ECG(s): Personally reviewed    Interpretation of Telemetry: NSR.

## 2019-10-24 NOTE — PROGRESS NOTE ADULT - ASSESSMENT
60 y/o F w/ PMH of HTN, HLD referred for polymorphic VT during stress test.    #Polymorphic VT  -Await final [facility] report; non-obstructive dz on my independent review  -Maintain K<4, Mg>2  -Tele reviewed; no pause-dependent events  -ECG reviewed; no e/o QTc prolongation  -Plan for possible EP study and VT ablation    #Will d/w attg  #Call w/ any Qs    Hadley Steel MD  Cardiology Fellow  [phone]  All Cardiology service information can be found 24/7 on [email or web address], password: [email or web address] 58 y/o F w/ PMH of HTN, HLD referred for polymorphic VT during stress test.    #Polymorphic VT  -Await final [facility] report; non-obstructive dz on my independent review  -Maintain K<4, Mg>2  -Tele reviewed; no pause-dependent events  -ECG reviewed; no e/o QTc prolongation  -Check TTE  -Pt may benefit from ICD pending TTE results. Pt may also benefit from EP study. Both possibly as outpt.    #Will d/w attg  #Call w/ any Qs    Hadley Steel MD  Cardiology Fellow  [phone]  All Cardiology service information can be found 24/7 on [email or web address], password: [email or web address]

## 2019-10-25 LAB
ANION GAP SERPL CALC-SCNC: 13 MMO/L — SIGNIFICANT CHANGE UP (ref 7–14)
BASOPHILS # BLD AUTO: 0.02 K/UL — SIGNIFICANT CHANGE UP (ref 0–0.2)
BASOPHILS NFR BLD AUTO: 0.2 % — SIGNIFICANT CHANGE UP (ref 0–2)
BUN SERPL-MCNC: 12 MG/DL — SIGNIFICANT CHANGE UP (ref 7–23)
CALCIUM SERPL-MCNC: 9.9 MG/DL — SIGNIFICANT CHANGE UP (ref 8.4–10.5)
CHLORIDE SERPL-SCNC: 98 MMOL/L — SIGNIFICANT CHANGE UP (ref 98–107)
CO2 SERPL-SCNC: 26 MMOL/L — SIGNIFICANT CHANGE UP (ref 22–31)
CREAT SERPL-MCNC: 0.81 MG/DL — SIGNIFICANT CHANGE UP (ref 0.5–1.3)
EOSINOPHIL # BLD AUTO: 0.6 K/UL — HIGH (ref 0–0.5)
EOSINOPHIL NFR BLD AUTO: 6.1 % — HIGH (ref 0–6)
GLUCOSE SERPL-MCNC: 127 MG/DL — HIGH (ref 70–99)
HCT VFR BLD CALC: 46.9 % — HIGH (ref 34.5–45)
HGB BLD-MCNC: 14.7 G/DL — SIGNIFICANT CHANGE UP (ref 11.5–15.5)
IMM GRANULOCYTES NFR BLD AUTO: 0.5 % — SIGNIFICANT CHANGE UP (ref 0–1.5)
LYMPHOCYTES # BLD AUTO: 2.6 K/UL — SIGNIFICANT CHANGE UP (ref 1–3.3)
LYMPHOCYTES # BLD AUTO: 26.5 % — SIGNIFICANT CHANGE UP (ref 13–44)
MAGNESIUM SERPL-MCNC: 1.9 MG/DL — SIGNIFICANT CHANGE UP (ref 1.6–2.6)
MCHC RBC-ENTMCNC: 28.4 PG — SIGNIFICANT CHANGE UP (ref 27–34)
MCHC RBC-ENTMCNC: 31.3 % — LOW (ref 32–36)
MCV RBC AUTO: 90.5 FL — SIGNIFICANT CHANGE UP (ref 80–100)
MONOCYTES # BLD AUTO: 0.87 K/UL — SIGNIFICANT CHANGE UP (ref 0–0.9)
MONOCYTES NFR BLD AUTO: 8.9 % — SIGNIFICANT CHANGE UP (ref 2–14)
NEUTROPHILS # BLD AUTO: 5.68 K/UL — SIGNIFICANT CHANGE UP (ref 1.8–7.4)
NEUTROPHILS NFR BLD AUTO: 57.8 % — SIGNIFICANT CHANGE UP (ref 43–77)
NRBC # FLD: 0 K/UL — SIGNIFICANT CHANGE UP (ref 0–0)
PHOSPHATE SERPL-MCNC: 4 MG/DL — SIGNIFICANT CHANGE UP (ref 2.5–4.5)
PLATELET # BLD AUTO: 322 K/UL — SIGNIFICANT CHANGE UP (ref 150–400)
PMV BLD: 11.3 FL — SIGNIFICANT CHANGE UP (ref 7–13)
POTASSIUM SERPL-MCNC: 4 MMOL/L — SIGNIFICANT CHANGE UP (ref 3.5–5.3)
POTASSIUM SERPL-SCNC: 4 MMOL/L — SIGNIFICANT CHANGE UP (ref 3.5–5.3)
RBC # BLD: 5.18 M/UL — SIGNIFICANT CHANGE UP (ref 3.8–5.2)
RBC # FLD: 13 % — SIGNIFICANT CHANGE UP (ref 10.3–14.5)
SODIUM SERPL-SCNC: 137 MMOL/L — SIGNIFICANT CHANGE UP (ref 135–145)
WBC # BLD: 9.82 K/UL — SIGNIFICANT CHANGE UP (ref 3.8–10.5)
WBC # FLD AUTO: 9.82 K/UL — SIGNIFICANT CHANGE UP (ref 3.8–10.5)

## 2019-10-25 PROCEDURE — 99232 SBSQ HOSP IP/OBS MODERATE 35: CPT

## 2019-10-25 PROCEDURE — 93306 TTE W/DOPPLER COMPLETE: CPT | Mod: 26

## 2019-10-25 RX ADMIN — Medication 1 TABLET(S): at 06:05

## 2019-10-25 RX ADMIN — ATORVASTATIN CALCIUM 80 MILLIGRAM(S): 80 TABLET, FILM COATED ORAL at 21:47

## 2019-10-25 RX ADMIN — Medication 81 MILLIGRAM(S): at 11:33

## 2019-10-25 RX ADMIN — Medication 0.5 MILLIGRAM(S): at 21:51

## 2019-10-25 NOTE — PROGRESS NOTE ADULT - SUBJECTIVE AND OBJECTIVE BOX
Patient is a 59y old  Female who presents with a chief complaint of chest pain with left arm discomfort (24 Oct 2019 10:44)  59 yr old female, visiting from [facility], presented to the ED with chest pain and left upper arm discomfort. PMhx of HTN and HLD. Patient reports that out of 8 siblings, 6 brothers had sudden cardiac arrest, 3  at home and 3  within 2 days in a hospital setting and 1 sister had sudden cardiac arrest and  2 to 3 days later in a hospital.  Patient went to cardiologist and a treadmill stress test was ordered and while walking on the treadmill patient develop sustained polymorphic wide complex tachycardia. She was sent to the ED and a LHC was performed and results revealed non-obstructive CAD of the LCx, IFR 0.91, LVEF 70%, and LVEDP 10. Patient was admitted to telemetry and telemetry shows sinus rhythm with rare sinus tachycardia. There are no pauses or ectopy. Last EKG performed 10/21/19 shows no QTc prolongation. Patient has remained hemodynamically stable and pain free awaiting a transthoracic echocardiogram.       PAST MEDICAL & SURGICAL HISTORY:  CAD in native artery  CHF, chronic: diastolic  HTN (hypertension)  HLD (hyperlipidemia)  H/O eye surgery: Left eye for cataracts; 2019  S/P cholecystectomy: 20 years ago  No significant past surgical history      MEDICATIONS  (STANDING):  aspirin enteric coated 81 milliGRAM(s) Oral daily  atorvastatin 80 milliGRAM(s) Oral at bedtime  clonazePAM  Tablet 0.5 milliGRAM(s) Oral at bedtime  influenza   Vaccine 0.5 milliLiter(s) IntraMuscular once  triamterene 37.5 mG/hydrochlorothiazide 25 mG Tablet 1 Tablet(s) Oral daily    MEDICATIONS  (PRN):  acetaminophen   Tablet .. 650 milliGRAM(s) Oral every 6 hours PRN Mild Pain (1 - 3), Moderate Pain (4 - 6), Severe Pain (7 - 10)            Vital Signs Last 24 Hrs  T(C): 36.3 (25 Oct 2019 05:50), Max: 36.7 (24 Oct 2019 14:38)  T(F): 97.4 (25 Oct 2019 05:50), Max: 98 (24 Oct 2019 14:38)  HR: 72 (25 Oct 2019 05:50) (72 - 85)  BP: 127/58 (25 Oct 2019 05:50) (123/64 - 140/89)  BP(mean): --  RR: 17 (25 Oct 2019 05:50) (17 - 18)  SpO2: 96% (25 Oct 2019 05:50) (96% - 98%)            INTERPRETATION OF TELEMETRY: Sinus rhythm with rare sinus tachycardia and no ectopy, no pauses.            LABS:                        14.7   9.82  )-----------( 322      ( 25 Oct 2019 06:30 )             46.9     1025    137  |  98  |  12  ----------------------------<  127<H>  4.0   |  26  |  0.81    Ca    9.9      25 Oct 2019 06:30  Phos  4.0     10-25  Mg     1.9     10-25    TPro  7.4  /  Alb  4.0  /  TBili  0.4  /  DBili  < 0.2  /  AST  24  /  ALT  19  /  AlkPhos  90  10            I&O's Summary    24 Oct 2019 07:  -  25 Oct 2019 07:00  --------------------------------------------------------  IN: 1700 mL / OUT: 2300 mL / NET: -600 mL    25 Oct 2019 07:01  -  25 Oct 2019 14:04  --------------------------------------------------------  IN: 440 mL / OUT: 500 mL / NET: -60 mL      BNP  RADIOLOGY & ADDITIONAL STUDIES:      PHYSICAL EXAM:    GENERAL: In no apparent distress, well nourished, and hydrated.  HEAD:  Atraumatic, Normocephalic  EYES: EOMI, PERRLA, conjunctiva and sclera clear  ENMT: No tonsillar erythema, exudates, or enlargement; Moist mucous membranes, Good dentition, No lesions  NECK: Supple and normal thyroid.  No JVD or carotid bruit.  Carotid pulse is 2+ bilaterally.  HEART: Regular rate and rhythm; No murmurs, rubs, or gallops.  PULMONARY: Clear to auscultation and percussion.  No rales, wheezing, or rhonchi bilaterally.  ABDOMEN: Soft, Nontender, Nondistended; Bowel sounds present  EXTREMITIES:  2+ Peripheral Pulses, No clubbing, cyanosis, or edema  LYMPH: No lymphadenopathy noted  NEUROLOGICAL: Grossly nonfocal

## 2019-10-25 NOTE — PROGRESS NOTE ADULT - ASSESSMENT
59 yr old female, visiting from [facility], presented to the ED with chest pain and left upper arm discomfort. PMhx of HTN and HLD.Patient reports that out of 8 siblings, 6 brothers had sudden cardiac arrest, 3  at home and 3  within 2 days in a hospital setting and 1 sister had sudden cardiac arrest and  2 to 3 days later in a hospital.  Patient went to cardiologist and a treadmill stress test was ordered and while walking on the treadmill patient develop sustained polymorphic wide complex tachycardia. She was sent to the ED and a LHC was performed and results revealed non-obstructive CAD of the LCx, IFR 0.91, LVEF 70%, and LVEDP 10. Patient was admitted to telemetry and telemetry shows sinus rhythm with rare sinus tachycardia. There are no pauses or ectopy. Last EKG performed 10/21/19 shows no QTc prolongation. Patient has remained hemodynamically stable and pain free awaiting a transthoracic echocardiogram.     Sustained wide complex tachycardia:  -Continue telemetry monitoring  -awaiting 2 Decho/CMR  -C non-obstructive LCx disease, EF 70%   LVEDP 10, IFR 0.91  - Monitor EKG for QTc  -Possible EP study/possible ablation to evaluate for arrhythmia  Discussed and reviewed with Dr. Verena Adamson, DNP-C 59 yr old female, visiting from [facility], presented to the ED with chest pain and left upper arm discomfort. PMhx of HTN and HLD.Patient reports that out of 8 siblings, 6 brothers had sudden cardiac arrest, 3  at home and 3  within 2 days in a hospital setting and 1 sister had sudden cardiac arrest and  2 to 3 days later in a hospital.  Patient went to cardiologist and a treadmill stress test was ordered and while walking on the treadmill patient develop sustained polymorphic wide complex tachycardia. She was sent to the ED and a LHC was performed and results revealed non-obstructive CAD of the LCx, IFR 0.91, LVEF 70%, and LVEDP 10. Patient was admitted to telemetry and telemetry shows sinus rhythm with rare sinus tachycardia. There are no pauses or ectopy. Last EKG performed 10/21/19 shows no QTc prolongation. Patient has remained hemodynamically stable and pain free awaiting a transthoracic echocardiogram.     Sustained wide complex tachycardia:  -Continue telemetry monitoring  -awaiting 2 Decho  -Cardiac MRI  -C non-obstructive LCx disease, EF 70%   LVEDP 10, IFR 0.91  - Monitor EKG for QTc  -Possible EP study/possible ablation to evaluate for arrhythmia  Discussed and reviewed with Dr. Verena Adamson, DNP-C

## 2019-10-26 DIAGNOSIS — I10 ESSENTIAL (PRIMARY) HYPERTENSION: ICD-10-CM

## 2019-10-26 DIAGNOSIS — I47.2 VENTRICULAR TACHYCARDIA: ICD-10-CM

## 2019-10-26 LAB
ANION GAP SERPL CALC-SCNC: 17 MMO/L — HIGH (ref 7–14)
BASOPHILS # BLD AUTO: 0.04 K/UL — SIGNIFICANT CHANGE UP (ref 0–0.2)
BASOPHILS NFR BLD AUTO: 0.3 % — SIGNIFICANT CHANGE UP (ref 0–2)
BUN SERPL-MCNC: 12 MG/DL — SIGNIFICANT CHANGE UP (ref 7–23)
CALCIUM SERPL-MCNC: 9.6 MG/DL — SIGNIFICANT CHANGE UP (ref 8.4–10.5)
CHLORIDE SERPL-SCNC: 98 MMOL/L — SIGNIFICANT CHANGE UP (ref 98–107)
CO2 SERPL-SCNC: 24 MMOL/L — SIGNIFICANT CHANGE UP (ref 22–31)
CREAT SERPL-MCNC: 0.77 MG/DL — SIGNIFICANT CHANGE UP (ref 0.5–1.3)
EOSINOPHIL # BLD AUTO: 0.54 K/UL — HIGH (ref 0–0.5)
EOSINOPHIL NFR BLD AUTO: 4.3 % — SIGNIFICANT CHANGE UP (ref 0–6)
GLUCOSE SERPL-MCNC: 119 MG/DL — HIGH (ref 70–99)
HCT VFR BLD CALC: 44.6 % — SIGNIFICANT CHANGE UP (ref 34.5–45)
HGB BLD-MCNC: 14.4 G/DL — SIGNIFICANT CHANGE UP (ref 11.5–15.5)
IMM GRANULOCYTES NFR BLD AUTO: 0.6 % — SIGNIFICANT CHANGE UP (ref 0–1.5)
LYMPHOCYTES # BLD AUTO: 24.6 % — SIGNIFICANT CHANGE UP (ref 13–44)
LYMPHOCYTES # BLD AUTO: 3.1 K/UL — SIGNIFICANT CHANGE UP (ref 1–3.3)
MAGNESIUM SERPL-MCNC: 1.8 MG/DL — SIGNIFICANT CHANGE UP (ref 1.6–2.6)
MCHC RBC-ENTMCNC: 29.1 PG — SIGNIFICANT CHANGE UP (ref 27–34)
MCHC RBC-ENTMCNC: 32.3 % — SIGNIFICANT CHANGE UP (ref 32–36)
MCV RBC AUTO: 90.3 FL — SIGNIFICANT CHANGE UP (ref 80–100)
MONOCYTES # BLD AUTO: 1.05 K/UL — HIGH (ref 0–0.9)
MONOCYTES NFR BLD AUTO: 8.3 % — SIGNIFICANT CHANGE UP (ref 2–14)
NEUTROPHILS # BLD AUTO: 7.79 K/UL — HIGH (ref 1.8–7.4)
NEUTROPHILS NFR BLD AUTO: 61.9 % — SIGNIFICANT CHANGE UP (ref 43–77)
NRBC # FLD: 0 K/UL — SIGNIFICANT CHANGE UP (ref 0–0)
PLATELET # BLD AUTO: 314 K/UL — SIGNIFICANT CHANGE UP (ref 150–400)
PMV BLD: 11.5 FL — SIGNIFICANT CHANGE UP (ref 7–13)
POTASSIUM SERPL-MCNC: 3.7 MMOL/L — SIGNIFICANT CHANGE UP (ref 3.5–5.3)
POTASSIUM SERPL-SCNC: 3.7 MMOL/L — SIGNIFICANT CHANGE UP (ref 3.5–5.3)
RBC # BLD: 4.94 M/UL — SIGNIFICANT CHANGE UP (ref 3.8–5.2)
RBC # FLD: 13.1 % — SIGNIFICANT CHANGE UP (ref 10.3–14.5)
SODIUM SERPL-SCNC: 139 MMOL/L — SIGNIFICANT CHANGE UP (ref 135–145)
WBC # BLD: 12.6 K/UL — HIGH (ref 3.8–10.5)
WBC # FLD AUTO: 12.6 K/UL — HIGH (ref 3.8–10.5)

## 2019-10-26 PROCEDURE — 99233 SBSQ HOSP IP/OBS HIGH 50: CPT

## 2019-10-26 RX ADMIN — ATORVASTATIN CALCIUM 80 MILLIGRAM(S): 80 TABLET, FILM COATED ORAL at 21:46

## 2019-10-26 RX ADMIN — Medication 81 MILLIGRAM(S): at 11:36

## 2019-10-26 RX ADMIN — Medication 1 TABLET(S): at 06:33

## 2019-10-26 RX ADMIN — Medication 0.5 MILLIGRAM(S): at 21:46

## 2019-10-26 NOTE — PROGRESS NOTE ADULT - ASSESSMENT
58 y/o female with PMHx of HTN, HLD, diastolic CHF and non-obstructive CAD, p/w left sided chest heaviness and Lt arm discomfort., admitted to telemetry for a CTA to R/O ACS. s/p Stress c/b sustained VT

## 2019-10-26 NOTE — PROGRESS NOTE ADULT - PROBLEM SELECTOR PLAN 2
had episode of VT during stress test, NSR on tele o/n  -echo normal LVEF, no sig valvular disease  -cardiac MR  -EP following, plan for possible EPS/ablation

## 2019-10-26 NOTE — PROGRESS NOTE ADULT - SUBJECTIVE AND OBJECTIVE BOX
[facility] Division of [facility]  Venu Piper MD  Pager [phone]      Patient is a 59y old  Female who presents with a chief complaint of chest pain with left arm discomfort (25 Oct 2019 14:03)      SUBJECTIVE / OVERNIGHT EVENTS:    No events on tele. pt offers no new complaint     ADDITIONAL REVIEW OF SYSTEMS:    RESPIRATORY: No cough, wheezing, chills or hemoptysis; No shortness of breath  CARDIOVASCULAR: No chest pain, palpitations, dizziness, or leg swelling  GASTROINTESTINAL: No abdominal or epigastric pain. No nausea, vomiting, or hematemesis; No diarrhea or constipation. No melena or hematochezia.      MEDICATIONS  (STANDING):  aspirin enteric coated 81 milliGRAM(s) Oral daily  atorvastatin 80 milliGRAM(s) Oral at bedtime  clonazePAM  Tablet 0.5 milliGRAM(s) Oral at bedtime  influenza   Vaccine 0.5 milliLiter(s) IntraMuscular once  triamterene 37.5 mG/hydrochlorothiazide 25 mG Tablet 1 Tablet(s) Oral daily    MEDICATIONS  (PRN):  acetaminophen   Tablet .. 650 milliGRAM(s) Oral every 6 hours PRN Mild Pain (1 - 3), Moderate Pain (4 - 6), Severe Pain (7 - 10)      CAPILLARY BLOOD GLUCOSE        I&O's Summary    25 Oct 2019 07:01  -  26 Oct 2019 07:00  --------------------------------------------------------  IN: 1498 mL / OUT: 3150 mL / NET: -1652 mL    26 Oct 2019 07:01  -  26 Oct 2019 15:12  --------------------------------------------------------  IN: 0 mL / OUT: 1800 mL / NET: -1800 mL        PHYSICAL EXAM:  Vital Signs Last 24 Hrs  T(C): 36.6 (26 Oct 2019 06:32), Max: 36.7 (25 Oct 2019 15:47)  T(F): 97.9 (26 Oct 2019 06:32), Max: 98.1 (25 Oct 2019 23:16)  HR: 75 (26 Oct 2019 06:32) (68 - 87)  BP: 127/61 (26 Oct 2019 06:32) (127/61 - 143/63)  BP(mean): --  RR: 16 (26 Oct 2019 06:32) (16 - 18)  SpO2: 97% (26 Oct 2019 06:32) (96% - 97%)    CONSTITUTIONAL: NAD, well-developed, well-groomed  EYES: PERRLA; conjunctiva and sclera clear  ENMT: Moist oral mucosa, no pharyngeal injection or exudates; normal dentition  NECK: Supple, no palpable masses; no thyromegaly  RESPIRATORY: Normal respiratory effort; lungs are clear to auscultation bilaterally  CARDIOVASCULAR: Regular rate and rhythm, normal S1 and S2, no murmur/rub/gallop; No lower extremity edema; Peripheral pulses are 2+ bilaterally  ABDOMEN: Nontender to palpation, normoactive bowel sounds, no rebound/guarding; No hepatosplenomegaly  MUSCLOSKELETAL:  Normal gait; no clubbing or cyanosis of digits; no joint swelling or tenderness to palpation  PSYCH: A+O to person, place, and time; affect appropriate  NEUROLOGY: CN 2-12 are intact and symmetric; no gross sensory deficits;   SKIN: No rashes; no palpable lesions    LABS:                        14.4   12.60 )-----------( 314      ( 26 Oct 2019 06:35 )             44.6     10-26    139  |  98  |  12  ----------------------------<  119<H>  3.7   |  24  |  0.77    Ca    9.6      26 Oct 2019 06:35  Phos  4.0     10-25  Mg     1.8     10-26                  RADIOLOGY & ADDITIONAL TESTS:  Results Reviewed:   Imaging Personally Reviewed:  Electrocardiogram Personally Reviewed:    COORDINATION OF CARE:  Care Discussed with Consultants/Other Providers [Y/N]:  Prior or Outpatient Records Reviewed [Y/N]:

## 2019-10-27 DIAGNOSIS — D72.828 OTHER ELEVATED WHITE BLOOD CELL COUNT: ICD-10-CM

## 2019-10-27 LAB
ANION GAP SERPL CALC-SCNC: 15 MMO/L — HIGH (ref 7–14)
BASOPHILS # BLD AUTO: 0.05 K/UL — SIGNIFICANT CHANGE UP (ref 0–0.2)
BASOPHILS NFR BLD AUTO: 0.4 % — SIGNIFICANT CHANGE UP (ref 0–2)
BUN SERPL-MCNC: 15 MG/DL — SIGNIFICANT CHANGE UP (ref 7–23)
CALCIUM SERPL-MCNC: 10 MG/DL — SIGNIFICANT CHANGE UP (ref 8.4–10.5)
CHLORIDE SERPL-SCNC: 95 MMOL/L — LOW (ref 98–107)
CO2 SERPL-SCNC: 24 MMOL/L — SIGNIFICANT CHANGE UP (ref 22–31)
CREAT SERPL-MCNC: 0.74 MG/DL — SIGNIFICANT CHANGE UP (ref 0.5–1.3)
EOSINOPHIL # BLD AUTO: 0.37 K/UL — SIGNIFICANT CHANGE UP (ref 0–0.5)
EOSINOPHIL NFR BLD AUTO: 2.8 % — SIGNIFICANT CHANGE UP (ref 0–6)
GLUCOSE SERPL-MCNC: 127 MG/DL — HIGH (ref 70–99)
HCT VFR BLD CALC: 45.4 % — HIGH (ref 34.5–45)
HGB BLD-MCNC: 15.2 G/DL — SIGNIFICANT CHANGE UP (ref 11.5–15.5)
IMM GRANULOCYTES NFR BLD AUTO: 0.5 % — SIGNIFICANT CHANGE UP (ref 0–1.5)
LYMPHOCYTES # BLD AUTO: 22.9 % — SIGNIFICANT CHANGE UP (ref 13–44)
LYMPHOCYTES # BLD AUTO: 3.05 K/UL — SIGNIFICANT CHANGE UP (ref 1–3.3)
MAGNESIUM SERPL-MCNC: 1.9 MG/DL — SIGNIFICANT CHANGE UP (ref 1.6–2.6)
MCHC RBC-ENTMCNC: 29.5 PG — SIGNIFICANT CHANGE UP (ref 27–34)
MCHC RBC-ENTMCNC: 33.5 % — SIGNIFICANT CHANGE UP (ref 32–36)
MCV RBC AUTO: 88.2 FL — SIGNIFICANT CHANGE UP (ref 80–100)
MONOCYTES # BLD AUTO: 0.97 K/UL — HIGH (ref 0–0.9)
MONOCYTES NFR BLD AUTO: 7.3 % — SIGNIFICANT CHANGE UP (ref 2–14)
NEUTROPHILS # BLD AUTO: 8.82 K/UL — HIGH (ref 1.8–7.4)
NEUTROPHILS NFR BLD AUTO: 66.1 % — SIGNIFICANT CHANGE UP (ref 43–77)
NRBC # FLD: 0 K/UL — SIGNIFICANT CHANGE UP (ref 0–0)
PLATELET # BLD AUTO: 334 K/UL — SIGNIFICANT CHANGE UP (ref 150–400)
PMV BLD: 11.3 FL — SIGNIFICANT CHANGE UP (ref 7–13)
POTASSIUM SERPL-MCNC: 3.7 MMOL/L — SIGNIFICANT CHANGE UP (ref 3.5–5.3)
POTASSIUM SERPL-SCNC: 3.7 MMOL/L — SIGNIFICANT CHANGE UP (ref 3.5–5.3)
RBC # BLD: 5.15 M/UL — SIGNIFICANT CHANGE UP (ref 3.8–5.2)
RBC # FLD: 12.7 % — SIGNIFICANT CHANGE UP (ref 10.3–14.5)
SODIUM SERPL-SCNC: 134 MMOL/L — LOW (ref 135–145)
WBC # BLD: 13.32 K/UL — HIGH (ref 3.8–10.5)
WBC # FLD AUTO: 13.32 K/UL — HIGH (ref 3.8–10.5)

## 2019-10-27 PROCEDURE — 99233 SBSQ HOSP IP/OBS HIGH 50: CPT

## 2019-10-27 RX ADMIN — Medication 81 MILLIGRAM(S): at 11:21

## 2019-10-27 RX ADMIN — ATORVASTATIN CALCIUM 80 MILLIGRAM(S): 80 TABLET, FILM COATED ORAL at 21:20

## 2019-10-27 RX ADMIN — Medication 0.5 MILLIGRAM(S): at 21:20

## 2019-10-27 RX ADMIN — Medication 1 TABLET(S): at 05:56

## 2019-10-27 NOTE — PROGRESS NOTE ADULT - SUBJECTIVE AND OBJECTIVE BOX
[facility] Division of [facility] Medicine  Venu Piper MD  Pager [phone]      Patient is a 59y old  Female who presents with a chief complaint of chest pain with left arm discomfort (26 Oct 2019 15:11)      SUBJECTIVE / OVERNIGHT EVENTS:    ADDITIONAL REVIEW OF SYSTEMS:    CONSTITUTIONAL: No fever, weight loss, or fatigue  EYES: No eye pain, visual disturbances, or discharge  ENMT:  No difficulty hearing, tinnitus, vertigo; No sinus or throat pain  NECK: No pain or stiffness  RESPIRATORY: No cough, wheezing, chills or hemoptysis; No shortness of breath  CARDIOVASCULAR: No chest pain, palpitations, dizziness, or leg swelling  GASTROINTESTINAL: No abdominal or epigastric pain. No nausea, vomiting, or hematemesis; No diarrhea or constipation. No melena or hematochezia.  GENITOURINARY: No dysuria, frequency, hematuria, or incontinence  NEUROLOGICAL: No headaches, memory loss, loss of strength, numbness, or tremors  SKIN: No itching, burning, rashes, or lesions   MUSCULOSKELETAL: No joint pain or swelling; No muscle, back, or extremity pain  PSYCHIATRIC: No depression, anxiety, mood swings, or difficulty sleeping    MEDICATIONS  (STANDING):  aspirin enteric coated 81 milliGRAM(s) Oral daily  atorvastatin 80 milliGRAM(s) Oral at bedtime  clonazePAM  Tablet 0.5 milliGRAM(s) Oral at bedtime  influenza   Vaccine 0.5 milliLiter(s) IntraMuscular once  triamterene 37.5 mG/hydrochlorothiazide 25 mG Tablet 1 Tablet(s) Oral daily    MEDICATIONS  (PRN):  acetaminophen   Tablet .. 650 milliGRAM(s) Oral every 6 hours PRN Mild Pain (1 - 3), Moderate Pain (4 - 6), Severe Pain (7 - 10)      CAPILLARY BLOOD GLUCOSE        I&O's Summary    26 Oct 2019 07:01  -  27 Oct 2019 07:00  --------------------------------------------------------  IN: 400 mL / OUT: 3100 mL / NET: -2700 mL    27 Oct 2019 07:01  -  27 Oct 2019 11:26  --------------------------------------------------------  IN: 0 mL / OUT: 800 mL / NET: -800 mL        PHYSICAL EXAM:  Vital Signs Last 24 Hrs  T(C): 36.6 (27 Oct 2019 05:55), Max: 36.9 (26 Oct 2019 21:45)  T(F): 97.9 (27 Oct 2019 05:55), Max: 98.4 (26 Oct 2019 21:45)  HR: 85 (27 Oct 2019 05:55) (81 - 89)  BP: 120/54 (27 Oct 2019 05:55) (120/54 - 152/62)  BP(mean): --  RR: 18 (27 Oct 2019 05:55) (16 - 18)  SpO2: 99% (27 Oct 2019 05:55) (95% - 99%)    CONSTITUTIONAL: NAD, well-developed, well-groomed  EYES: PERRLA; conjunctiva and sclera clear  ENMT: Moist oral mucosa, no pharyngeal injection or exudates; normal dentition  NECK: Supple, no palpable masses; no thyromegaly  RESPIRATORY: Normal respiratory effort; lungs are clear to auscultation bilaterally  CARDIOVASCULAR: Regular rate and rhythm, normal S1 and S2, no murmur/rub/gallop; No lower extremity edema; Peripheral pulses are 2+ bilaterally  ABDOMEN: Nontender to palpation, normoactive bowel sounds, no rebound/guarding; No hepatosplenomegaly  MUSCLOSKELETAL:  Normal gait; no clubbing or cyanosis of digits; no joint swelling or tenderness to palpation  PSYCH: A+O to person, place, and time; affect appropriate  NEUROLOGY: CN 2-12 are intact and symmetric; no gross sensory deficits;   SKIN: No rashes; no palpable lesions    LABS:                        15.2   13.32 )-----------( 334      ( 27 Oct 2019 06:00 )             45.4     10-27    134<L>  |  95<L>  |  15  ----------------------------<  127<H>  3.7   |  24  |  0.74    Ca    10.0      27 Oct 2019 06:00  Mg     1.9     10-27                  RADIOLOGY & ADDITIONAL TESTS:  Results Reviewed:   Imaging Personally Reviewed:  Electrocardiogram Personally Reviewed:    COORDINATION OF CARE:  Care Discussed with Consultants/Other Providers [Y/N]:  Prior or Outpatient Records Reviewed [Y/N]:

## 2019-10-28 DIAGNOSIS — E87.6 HYPOKALEMIA: ICD-10-CM

## 2019-10-28 LAB
ANION GAP SERPL CALC-SCNC: 17 MMO/L — HIGH (ref 7–14)
BASOPHILS # BLD AUTO: 0.05 K/UL — SIGNIFICANT CHANGE UP (ref 0–0.2)
BASOPHILS NFR BLD AUTO: 0.4 % — SIGNIFICANT CHANGE UP (ref 0–2)
BUN SERPL-MCNC: 20 MG/DL — SIGNIFICANT CHANGE UP (ref 7–23)
CALCIUM SERPL-MCNC: 9.9 MG/DL — SIGNIFICANT CHANGE UP (ref 8.4–10.5)
CHLORIDE SERPL-SCNC: 95 MMOL/L — LOW (ref 98–107)
CO2 SERPL-SCNC: 24 MMOL/L — SIGNIFICANT CHANGE UP (ref 22–31)
CREAT SERPL-MCNC: 0.76 MG/DL — SIGNIFICANT CHANGE UP (ref 0.5–1.3)
EOSINOPHIL # BLD AUTO: 0.53 K/UL — HIGH (ref 0–0.5)
EOSINOPHIL NFR BLD AUTO: 4 % — SIGNIFICANT CHANGE UP (ref 0–6)
GLUCOSE SERPL-MCNC: 122 MG/DL — HIGH (ref 70–99)
HCT VFR BLD CALC: 43.6 % — SIGNIFICANT CHANGE UP (ref 34.5–45)
HGB BLD-MCNC: 14.8 G/DL — SIGNIFICANT CHANGE UP (ref 11.5–15.5)
IMM GRANULOCYTES NFR BLD AUTO: 0.6 % — SIGNIFICANT CHANGE UP (ref 0–1.5)
LYMPHOCYTES # BLD AUTO: 2.98 K/UL — SIGNIFICANT CHANGE UP (ref 1–3.3)
LYMPHOCYTES # BLD AUTO: 22.2 % — SIGNIFICANT CHANGE UP (ref 13–44)
MAGNESIUM SERPL-MCNC: 1.8 MG/DL — SIGNIFICANT CHANGE UP (ref 1.6–2.6)
MCHC RBC-ENTMCNC: 29.8 PG — SIGNIFICANT CHANGE UP (ref 27–34)
MCHC RBC-ENTMCNC: 33.9 % — SIGNIFICANT CHANGE UP (ref 32–36)
MCV RBC AUTO: 87.9 FL — SIGNIFICANT CHANGE UP (ref 80–100)
MONOCYTES # BLD AUTO: 1.14 K/UL — HIGH (ref 0–0.9)
MONOCYTES NFR BLD AUTO: 8.5 % — SIGNIFICANT CHANGE UP (ref 2–14)
NEUTROPHILS # BLD AUTO: 8.62 K/UL — HIGH (ref 1.8–7.4)
NEUTROPHILS NFR BLD AUTO: 64.3 % — SIGNIFICANT CHANGE UP (ref 43–77)
NRBC # FLD: 0 K/UL — SIGNIFICANT CHANGE UP (ref 0–0)
PLATELET # BLD AUTO: 315 K/UL — SIGNIFICANT CHANGE UP (ref 150–400)
PMV BLD: 11.4 FL — SIGNIFICANT CHANGE UP (ref 7–13)
POTASSIUM SERPL-MCNC: 3.3 MMOL/L — LOW (ref 3.5–5.3)
POTASSIUM SERPL-SCNC: 3.3 MMOL/L — LOW (ref 3.5–5.3)
RBC # BLD: 4.96 M/UL — SIGNIFICANT CHANGE UP (ref 3.8–5.2)
RBC # FLD: 12.9 % — SIGNIFICANT CHANGE UP (ref 10.3–14.5)
SODIUM SERPL-SCNC: 136 MMOL/L — SIGNIFICANT CHANGE UP (ref 135–145)
WBC # BLD: 13.4 K/UL — HIGH (ref 3.8–10.5)
WBC # FLD AUTO: 13.4 K/UL — HIGH (ref 3.8–10.5)

## 2019-10-28 PROCEDURE — 99232 SBSQ HOSP IP/OBS MODERATE 35: CPT

## 2019-10-28 PROCEDURE — 75561 CARDIAC MRI FOR MORPH W/DYE: CPT | Mod: 26

## 2019-10-28 RX ORDER — FAMOTIDINE 10 MG/ML
20 INJECTION INTRAVENOUS
Refills: 0 | Status: DISCONTINUED | OUTPATIENT
Start: 2019-10-28 | End: 2019-10-29

## 2019-10-28 RX ORDER — METOPROLOL TARTRATE 50 MG
25 TABLET ORAL
Refills: 0 | Status: DISCONTINUED | OUTPATIENT
Start: 2019-10-28 | End: 2019-10-29

## 2019-10-28 RX ORDER — POTASSIUM CHLORIDE 20 MEQ
40 PACKET (EA) ORAL ONCE
Refills: 0 | Status: COMPLETED | OUTPATIENT
Start: 2019-10-28 | End: 2019-10-28

## 2019-10-28 RX ADMIN — Medication 1 TABLET(S): at 05:20

## 2019-10-28 RX ADMIN — Medication 81 MILLIGRAM(S): at 13:08

## 2019-10-28 RX ADMIN — FAMOTIDINE 20 MILLIGRAM(S): 10 INJECTION INTRAVENOUS at 17:12

## 2019-10-28 RX ADMIN — ATORVASTATIN CALCIUM 80 MILLIGRAM(S): 80 TABLET, FILM COATED ORAL at 21:53

## 2019-10-28 RX ADMIN — Medication 25 MILLIGRAM(S): at 17:12

## 2019-10-28 RX ADMIN — Medication 40 MILLIEQUIVALENT(S): at 13:08

## 2019-10-28 RX ADMIN — Medication 0.5 MILLIGRAM(S): at 21:53

## 2019-10-28 NOTE — PROGRESS NOTE ADULT - ASSESSMENT
60 y/o F w/ PMH of HTN, HLD referred for polymorphic VT during stress test.    #Polymorphic VT  -TTE reviewed; unremarkable  -cMRI done; await results  -LHC w/ 60% LCx dz  -Maintain K<4, Mg>2  -Would plan to medically manage CAD and poly VT  -C/w ASA/statin  -Would start BB metop tartrate 25mg PO BID    #Will d/w attg  #Call w/ any Qs    Hadley Steel MD  Cardiology Fellow  [phone]  All Cardiology service information can be found 24/7 on [email or web address], password: [email or web address]

## 2019-10-28 NOTE — PROGRESS NOTE ADULT - SUBJECTIVE AND OBJECTIVE BOX
Patient is a 59y old  Female who presents with a chief complaint of chest pain with left arm discomfort (26 Oct 2019 15:11)      SUBJECTIVE / OVERNIGHT EVENTS:    ADDITIONAL REVIEW OF SYSTEMS:    CONSTITUTIONAL: No fever, weight loss, or fatigue  EYES: No eye pain, visual disturbances, or discharge  ENMT:  No difficulty hearing, tinnitus, vertigo; No sinus or throat pain  NECK: No pain or stiffness  RESPIRATORY: No cough, wheezing, chills or hemoptysis; No shortness of breath  CARDIOVASCULAR: No chest pain, palpitations, dizziness, or leg swelling  GASTROINTESTINAL: No abdominal or epigastric pain. No nausea, vomiting, or hematemesis; No diarrhea or constipation. No melena or hematochezia.  GENITOURINARY: No dysuria, frequency, hematuria, or incontinence  NEUROLOGICAL: No headaches, memory loss, loss of strength, numbness, or tremors  SKIN: No itching, burning, rashes, or lesions   MUSCULOSKELETAL: No joint pain or swelling; No muscle, back, or extremity pain  PSYCHIATRIC: No depression, anxiety, mood swings, or difficulty sleeping      MEDICATIONS  (STANDING):  aspirin enteric coated 81 milliGRAM(s) Oral daily  atorvastatin 80 milliGRAM(s) Oral at bedtime  clonazePAM  Tablet 0.5 milliGRAM(s) Oral at bedtime  influenza   Vaccine 0.5 milliLiter(s) IntraMuscular once  triamterene 37.5 mG/hydrochlorothiazide 25 mG Tablet 1 Tablet(s) Oral daily    MEDICATIONS  (PRN):  acetaminophen   Tablet .. 650 milliGRAM(s) Oral every 6 hours PRN Mild Pain (1 - 3), Moderate Pain (4 - 6), Severe Pain (7 - 10)        CAPILLARY BLOOD GLUCOSE        PHYSICAL EXAM:  Vital Signs Last 24 Hrs  T(C): 36.6 (27 Oct 2019 05:55), Max: 36.9 (26 Oct 2019 21:45)  T(F): 97.9 (27 Oct 2019 05:55), Max: 98.4 (26 Oct 2019 21:45)  HR: 85 (27 Oct 2019 05:55) (81 - 89)  BP: 120/54 (27 Oct 2019 05:55) (120/54 - 152/62)  BP(mean): --  RR: 18 (27 Oct 2019 05:55) (16 - 18)  SpO2: 99% (27 Oct 2019 05:55) (95% - 99%)    CONSTITUTIONAL: NAD, well-developed, well-groomed  EYES: PERRLA; conjunctiva and sclera clear  ENMT: Moist oral mucosa, no pharyngeal injection or exudates; normal dentition  NECK: Supple, no palpable masses; no thyromegaly  RESPIRATORY: Normal respiratory effort; lungs are clear to auscultation bilaterally  CARDIOVASCULAR: Regular rate and rhythm, normal S1 and S2, no murmur/rub/gallop; No lower extremity edema; Peripheral pulses are 2+ bilaterally  ABDOMEN: Nontender to palpation, normoactive bowel sounds, no rebound/guarding; No hepatosplenomegaly  MUSCLOSKELETAL:  Normal gait; no clubbing or cyanosis of digits; no joint swelling or tenderness to palpation  PSYCH: A+O to person, place, and time; affect appropriate  NEUROLOGY: CN 2-12 are intact and symmetric; no gross sensory deficits;   SKIN: No rashes; no palpable lesions    LABS:                             14.8   13.40 )-----------( 315      ( 28 Oct 2019 06:00 )             43.6         10-28    136  |  95<L>  |  20  ----------------------------<  122<H>  3.3<L>   |  24  |  0.76    Ca    9.9      28 Oct 2019 06:00  Mg     1.8     10-28              RADIOLOGY & ADDITIONAL TESTS:  Results Reviewed:   Imaging Personally Reviewed:  Electrocardiogram Personally Reviewed:    COORDINATION OF CARE:  Care Discussed with Consultants/Other Providers [Y/N]:  Prior or Outpatient Records Reviewed [Y/N]: Patient is a 59y old  Female who presents with a chief complaint of chest pain with left arm discomfort (26 Oct 2019 15:11)      SUBJECTIVE / OVERNIGHT EVENTS: Pt seen and examined by me Daughter at bedside  Denies CP/SOB  Denies Fever/chills    ADDITIONAL REVIEW OF SYSTEMS:    CONSTITUTIONAL: No fever, weight loss, or fatigue  EYES: No eye pain, visual disturbances, or discharge  ENMT:  No difficulty hearing, tinnitus, vertigo; No sinus or throat pain  NECK: No pain or stiffness  RESPIRATORY: No cough, wheezing, chills or hemoptysis; No shortness of breath  CARDIOVASCULAR: No chest pain, palpitations, dizziness, or leg swelling  GASTROINTESTINAL: No abdominal or epigastric pain. No nausea, vomiting, or hematemesis; No diarrhea or constipation. No melena or hematochezia.  GENITOURINARY: No dysuria, frequency, hematuria, or incontinence  NEUROLOGICAL: No headaches, memory loss, loss of strength, numbness, or tremors  SKIN: No itching, burning, rashes, or lesions   MUSCULOSKELETAL: No joint pain or swelling; No muscle, back, or extremity pain  PSYCHIATRIC: No depression, anxiety, mood swings, or difficulty sleeping      MEDICATIONS  (STANDING):  aspirin enteric coated 81 milliGRAM(s) Oral daily  atorvastatin 80 milliGRAM(s) Oral at bedtime  clonazePAM  Tablet 0.5 milliGRAM(s) Oral at bedtime  influenza   Vaccine 0.5 milliLiter(s) IntraMuscular once  triamterene 37.5 mG/hydrochlorothiazide 25 mG Tablet 1 Tablet(s) Oral daily    MEDICATIONS  (PRN):  acetaminophen   Tablet .. 650 milliGRAM(s) Oral every 6 hours PRN Mild Pain (1 - 3), Moderate Pain (4 - 6), Severe Pain (7 - 10)        CAPILLARY BLOOD GLUCOSE        PHYSICAL EXAM:  Vital Signs Last 24 Hrs  T(C): 36.6 (27 Oct 2019 05:55), Max: 36.9 (26 Oct 2019 21:45)  T(F): 97.9 (27 Oct 2019 05:55), Max: 98.4 (26 Oct 2019 21:45)  HR: 85 (27 Oct 2019 05:55) (81 - 89)  BP: 120/54 (27 Oct 2019 05:55) (120/54 - 152/62)  BP(mean): --  RR: 18 (27 Oct 2019 05:55) (16 - 18)  SpO2: 99% (27 Oct 2019 05:55) (95% - 99%)    CONSTITUTIONAL: NAD, well-developed, well-groomed  EYES: PERRLA; conjunctiva and sclera clear  ENMT: Moist oral mucosa, no pharyngeal injection or exudates; normal dentition  NECK: Supple, no palpable masses; no thyromegaly  RESPIRATORY: Normal respiratory effort; lungs are clear to auscultation bilaterally  CARDIOVASCULAR: Regular rate and rhythm, normal S1 and S2, no murmur/rub/gallop; No lower extremity edema; Peripheral pulses are 2+ bilaterally  ABDOMEN: Nontender to palpation, normoactive bowel sounds, no rebound/guarding; No hepatosplenomegaly  MUSCLOSKELETAL:  Normal gait; no clubbing or cyanosis of digits; no joint swelling or tenderness to palpation  PSYCH: A+O to person, place, and time; affect appropriate  NEUROLOGY: CN 2-12 are intact and symmetric; no gross sensory deficits;   SKIN: No rashes; no palpable lesions    LABS:                             14.8   13.40 )-----------( 315      ( 28 Oct 2019 06:00 )             43.6         10-28    136  |  95<L>  |  20  ----------------------------<  122<H>  3.3<L>   |  24  |  0.76    Ca    9.9      28 Oct 2019 06:00  Mg     1.8     10-28              RADIOLOGY & ADDITIONAL TESTS:  Results Reviewed:   Imaging Personally Reviewed:  Electrocardiogram Personally Reviewed:    COORDINATION OF CARE:  Care Discussed with Consultants/Other Providers [Y/N]:  Prior or Outpatient Records Reviewed [Y/N]: Patient is a 59y old  Female who presents with a chief complaint of chest pain with left arm discomfort (26 Oct 2019 15:11)      SUBJECTIVE / OVERNIGHT EVENTS: Pt seen and examined by me Daughter at bedside  pt reports epigastric burning,  takes Ranitidine at home  No nausea or vomiting or abdominal pain   Denies CP/SOB  Denies Fever/chills    ADDITIONAL REVIEW OF SYSTEMS:    CONSTITUTIONAL: No fever, weight loss, or fatigue  EYES: No eye pain, visual disturbances, or discharge  ENMT:  No difficulty hearing, tinnitus, vertigo; No sinus or throat pain  NECK: No pain or stiffness  RESPIRATORY: No cough, wheezing, chills or hemoptysis; No shortness of breath  CARDIOVASCULAR: No chest pain, palpitations, dizziness, or leg swelling  GASTROINTESTINAL: No abdominal or epigastric pain. No nausea, vomiting, or hematemesis; No diarrhea or constipation. No melena or hematochezia.  GENITOURINARY: No dysuria, frequency, hematuria, or incontinence  NEUROLOGICAL: No headaches, memory loss, loss of strength, numbness, or tremors  SKIN: No itching, burning, rashes, or lesions   MUSCULOSKELETAL: No joint pain or swelling; No muscle, back, or extremity pain  PSYCHIATRIC: No depression, anxiety, mood swings, or difficulty sleeping      MEDICATIONS  (STANDING):  aspirin enteric coated 81 milliGRAM(s) Oral daily  atorvastatin 80 milliGRAM(s) Oral at bedtime  clonazePAM  Tablet 0.5 milliGRAM(s) Oral at bedtime  influenza   Vaccine 0.5 milliLiter(s) IntraMuscular once  triamterene 37.5 mG/hydrochlorothiazide 25 mG Tablet 1 Tablet(s) Oral daily    MEDICATIONS  (PRN):  acetaminophen   Tablet .. 650 milliGRAM(s) Oral every 6 hours PRN Mild Pain (1 - 3), Moderate Pain (4 - 6), Severe Pain (7 - 10)        CAPILLARY BLOOD GLUCOSE        PHYSICAL EXAM:  Vital Signs Last 24 Hrs  T(C): 36.6 (27 Oct 2019 05:55), Max: 36.9 (26 Oct 2019 21:45)  T(F): 97.9 (27 Oct 2019 05:55), Max: 98.4 (26 Oct 2019 21:45)  HR: 85 (27 Oct 2019 05:55) (81 - 89)  BP: 120/54 (27 Oct 2019 05:55) (120/54 - 152/62)  BP(mean): --  RR: 18 (27 Oct 2019 05:55) (16 - 18)  SpO2: 99% (27 Oct 2019 05:55) (95% - 99%)    CONSTITUTIONAL: NAD, well-developed, well-groomed  EYES: PERRLA; conjunctiva and sclera clear  ENMT: Moist oral mucosa, no pharyngeal injection or exudates; normal dentition  NECK: Supple, no palpable masses; no thyromegaly  RESPIRATORY: Normal respiratory effort; lungs are clear to auscultation bilaterally  CARDIOVASCULAR: Regular rate and rhythm, normal S1 and S2, no murmur/rub/gallop; No lower extremity edema; Peripheral pulses are 2+ bilaterally  ABDOMEN: Nontender to palpation, normoactive bowel sounds, no rebound/guarding; No hepatosplenomegaly  MUSCLOSKELETAL:  Normal gait; no clubbing or cyanosis of digits; no joint swelling or tenderness to palpation  PSYCH: A+O to person, place, and time; affect appropriate  NEUROLOGY: CN 2-12 are intact and symmetric; no gross sensory deficits;   SKIN: No rashes; no palpable lesions    LABS:                             14.8   13.40 )-----------( 315      ( 28 Oct 2019 06:00 )             43.6         10-28    136  |  95<L>  |  20  ----------------------------<  122<H>  3.3<L>   |  24  |  0.76    Ca    9.9      28 Oct 2019 06:00  Mg     1.8     10-28              RADIOLOGY & ADDITIONAL TESTS:  Results Reviewed:   Imaging Personally Reviewed:  Electrocardiogram Personally Reviewed:    COORDINATION OF CARE:  Care Discussed with Consultants/Other Providers [Y/N]:  Prior or Outpatient Records Reviewed [Y/N]:

## 2019-10-28 NOTE — PROGRESS NOTE ADULT - PROBLEM SELECTOR PLAN 1
-s/p LHC with 50% prox Circ disease, iFR negative, plan for medical management  -cont asa, statin had episode of VT during stress test, NSR on tele o/n  Echo normal LVEF, no sig valvular disease  EP following, Await Cardiac MRI results   WIll FU EP if plan for possible EPS/ablation

## 2019-10-28 NOTE — PROGRESS NOTE ADULT - PROBLEM SELECTOR PLAN 2
had episode of VT during stress test, NSR on tele o/n  -echo normal LVEF, no sig valvular disease  -cardiac MR  -EP following, plan for possible EPS/ablation -s/p LHC with 50% prox Circ disease, iFR negative, plan for medical management  -cont asa, statin

## 2019-10-28 NOTE — PROGRESS NOTE ADULT - ATTENDING COMMENTS
59F with HTN, HLD presents for wide complex tachycardia during stress test. She is s/p cor angiogram with no significant obstructions. TTE and CMR normal LV function with no LGE consistent with any scar. Will initiate beta blockers and fu as outpt.
59F with HTN, HLD presents for wide complex tachycardia during stress test. She is s/p cor angiogram with no significant obstructions. She will undego TTE and CMR for further work-up.
59F with HTN, HLD presents for wide complex tachycardia during stress test. She is s/p cor angiogram with no significant obstructions. She will undego TTE and possible CMR if abnormal echo for further work-up.

## 2019-10-28 NOTE — PROGRESS NOTE ADULT - SUBJECTIVE AND OBJECTIVE BOX
Patient seen and examined at bedside.    Overnight Events: No events. This AM, pt reports some palps; I reviewed her tele and it revealed only NSR. Otherwise, she denies any complaints.    Review Of Systems: No chest pain, shortness of breath, or palpitations            Current Meds:  acetaminophen   Tablet .. 650 milliGRAM(s) Oral every 6 hours PRN  aspirin enteric coated 81 milliGRAM(s) Oral daily  atorvastatin 80 milliGRAM(s) Oral at bedtime  clonazePAM  Tablet 0.5 milliGRAM(s) Oral at bedtime  influenza   Vaccine 0.5 milliLiter(s) IntraMuscular once  triamterene 37.5 mG/hydrochlorothiazide 25 mG Tablet 1 Tablet(s) Oral daily      Vitals:  T(F): 97.9 (10-28), Max: 98 (10-28)  HR: 81 (10-28) (77 - 85)  BP: 135/76 (10-28) (135/76 - 144/55)  RR: 16 (10-28)  SpO2: 99% (10-28)  I&O's Summary    27 Oct 2019 07:01  -  28 Oct 2019 07:00  --------------------------------------------------------  IN: 300 mL / OUT: 1650 mL / NET: -1350 mL        Physical Exam:  Gen: NAD.  HEENT: NCAT. PERRLA b/l.  Neck: No JVP elev.  CV: Normal S1, S2. RRR. No MRG.  Chest: CTAB. No WRR.  Abd: +BSx4. Soft. NTND.  Ext: No LE edema.  Skin: No cyanosis.                          14.8   13.40 )-----------( 315      ( 28 Oct 2019 06:00 )             43.6     10-28    136  |  95<L>  |  20  ----------------------------<  122<H>  3.3<L>   |  24  |  0.76    Ca    9.9      28 Oct 2019 06:00  Mg     1.8     10-28    Echo: < from: Transthoracic Echocardiogram (10.25.19 @ 19:29) >  1. Mitral annular calcification and calcified mitral  leaflets with normal diastolic opening.  Mild mitral  regurgitation.  2. Normal left ventricular internal dimensions and wall  thicknesses.  3. Normal left ventricular systolic function.  4. Normal right ventricular size and function.    Interpretation of Telemetry: NSR

## 2019-10-29 VITALS
TEMPERATURE: 98 F | SYSTOLIC BLOOD PRESSURE: 137 MMHG | DIASTOLIC BLOOD PRESSURE: 42 MMHG | RESPIRATION RATE: 15 BRPM | OXYGEN SATURATION: 99 % | HEART RATE: 71 BPM

## 2019-10-29 DIAGNOSIS — R59.1 GENERALIZED ENLARGED LYMPH NODES: ICD-10-CM

## 2019-10-29 LAB
ANION GAP SERPL CALC-SCNC: 15 MMO/L — HIGH (ref 7–14)
BASOPHILS # BLD AUTO: 0.05 K/UL — SIGNIFICANT CHANGE UP (ref 0–0.2)
BASOPHILS NFR BLD AUTO: 0.4 % — SIGNIFICANT CHANGE UP (ref 0–2)
BUN SERPL-MCNC: 14 MG/DL — SIGNIFICANT CHANGE UP (ref 7–23)
CALCIUM SERPL-MCNC: 9.8 MG/DL — SIGNIFICANT CHANGE UP (ref 8.4–10.5)
CHLORIDE SERPL-SCNC: 96 MMOL/L — LOW (ref 98–107)
CO2 SERPL-SCNC: 25 MMOL/L — SIGNIFICANT CHANGE UP (ref 22–31)
CREAT SERPL-MCNC: 0.69 MG/DL — SIGNIFICANT CHANGE UP (ref 0.5–1.3)
EOSINOPHIL # BLD AUTO: 0.62 K/UL — HIGH (ref 0–0.5)
EOSINOPHIL NFR BLD AUTO: 4.7 % — SIGNIFICANT CHANGE UP (ref 0–6)
GLUCOSE SERPL-MCNC: 105 MG/DL — HIGH (ref 70–99)
HCT VFR BLD CALC: 43.1 % — SIGNIFICANT CHANGE UP (ref 34.5–45)
HGB BLD-MCNC: 14.5 G/DL — SIGNIFICANT CHANGE UP (ref 11.5–15.5)
IMM GRANULOCYTES NFR BLD AUTO: 0.6 % — SIGNIFICANT CHANGE UP (ref 0–1.5)
LYMPHOCYTES # BLD AUTO: 24.6 % — SIGNIFICANT CHANGE UP (ref 13–44)
LYMPHOCYTES # BLD AUTO: 3.25 K/UL — SIGNIFICANT CHANGE UP (ref 1–3.3)
MAGNESIUM SERPL-MCNC: 2 MG/DL — SIGNIFICANT CHANGE UP (ref 1.6–2.6)
MCHC RBC-ENTMCNC: 29.5 PG — SIGNIFICANT CHANGE UP (ref 27–34)
MCHC RBC-ENTMCNC: 33.6 % — SIGNIFICANT CHANGE UP (ref 32–36)
MCV RBC AUTO: 87.6 FL — SIGNIFICANT CHANGE UP (ref 80–100)
MONOCYTES # BLD AUTO: 1.17 K/UL — HIGH (ref 0–0.9)
MONOCYTES NFR BLD AUTO: 8.9 % — SIGNIFICANT CHANGE UP (ref 2–14)
NEUTROPHILS # BLD AUTO: 8.03 K/UL — HIGH (ref 1.8–7.4)
NEUTROPHILS NFR BLD AUTO: 60.8 % — SIGNIFICANT CHANGE UP (ref 43–77)
NRBC # FLD: 0 K/UL — SIGNIFICANT CHANGE UP (ref 0–0)
PLATELET # BLD AUTO: 319 K/UL — SIGNIFICANT CHANGE UP (ref 150–400)
PMV BLD: 11.1 FL — SIGNIFICANT CHANGE UP (ref 7–13)
POTASSIUM SERPL-MCNC: 3.6 MMOL/L — SIGNIFICANT CHANGE UP (ref 3.5–5.3)
POTASSIUM SERPL-SCNC: 3.6 MMOL/L — SIGNIFICANT CHANGE UP (ref 3.5–5.3)
RBC # BLD: 4.92 M/UL — SIGNIFICANT CHANGE UP (ref 3.8–5.2)
RBC # FLD: 12.8 % — SIGNIFICANT CHANGE UP (ref 10.3–14.5)
SODIUM SERPL-SCNC: 136 MMOL/L — SIGNIFICANT CHANGE UP (ref 135–145)
WBC # BLD: 13.2 K/UL — HIGH (ref 3.8–10.5)
WBC # FLD AUTO: 13.2 K/UL — HIGH (ref 3.8–10.5)

## 2019-10-29 PROCEDURE — 99239 HOSP IP/OBS DSCHRG MGMT >30: CPT

## 2019-10-29 RX ORDER — POTASSIUM CHLORIDE 20 MEQ
40 PACKET (EA) ORAL ONCE
Refills: 0 | Status: COMPLETED | OUTPATIENT
Start: 2019-10-29 | End: 2019-10-29

## 2019-10-29 RX ORDER — ATORVASTATIN CALCIUM 80 MG/1
1 TABLET, FILM COATED ORAL
Qty: 30 | Refills: 0
Start: 2019-10-29 | End: 2019-11-27

## 2019-10-29 RX ORDER — ASPIRIN/CALCIUM CARB/MAGNESIUM 324 MG
1 TABLET ORAL
Qty: 30 | Refills: 0
Start: 2019-10-29 | End: 2019-11-27

## 2019-10-29 RX ORDER — RANITIDINE HYDROCHLORIDE 150 MG/1
1 TABLET, FILM COATED ORAL
Qty: 0 | Refills: 0 | DISCHARGE

## 2019-10-29 RX ORDER — TRIAMTERENE/HYDROCHLOROTHIAZID 75 MG-50MG
1 TABLET ORAL
Qty: 30 | Refills: 0
Start: 2019-10-29 | End: 2019-11-27

## 2019-10-29 RX ORDER — METOPROLOL TARTRATE 50 MG
1 TABLET ORAL
Qty: 60 | Refills: 0
Start: 2019-10-29 | End: 2019-11-27

## 2019-10-29 RX ORDER — TRIAMTERENE/HYDROCHLOROTHIAZID 75 MG-50MG
1 TABLET ORAL
Qty: 0 | Refills: 0 | DISCHARGE

## 2019-10-29 RX ORDER — RANITIDINE HYDROCHLORIDE 150 MG/1
1 TABLET, FILM COATED ORAL
Qty: 30 | Refills: 0
Start: 2019-10-29 | End: 2019-11-27

## 2019-10-29 RX ORDER — ASPIRIN/CALCIUM CARB/MAGNESIUM 324 MG
1 TABLET ORAL
Qty: 0 | Refills: 0 | DISCHARGE

## 2019-10-29 RX ORDER — ROSUVASTATIN CALCIUM 5 MG/1
1 TABLET ORAL
Qty: 0 | Refills: 0 | DISCHARGE

## 2019-10-29 RX ADMIN — Medication 1 TABLET(S): at 05:25

## 2019-10-29 RX ADMIN — Medication 40 MILLIEQUIVALENT(S): at 13:14

## 2019-10-29 RX ADMIN — FAMOTIDINE 20 MILLIGRAM(S): 10 INJECTION INTRAVENOUS at 05:25

## 2019-10-29 RX ADMIN — Medication 25 MILLIGRAM(S): at 05:25

## 2019-10-29 RX ADMIN — Medication 650 MILLIGRAM(S): at 09:04

## 2019-10-29 RX ADMIN — Medication 81 MILLIGRAM(S): at 13:14

## 2019-10-29 RX ADMIN — Medication 650 MILLIGRAM(S): at 08:34

## 2019-10-29 NOTE — DISCHARGE NOTE PROVIDER - NSDCMRMEDTOKEN_GEN_ALL_CORE_FT
Aspirin Enteric Coated 81 mg oral delayed release tablet: 1 tab(s) orally once a day-ALL MEDICATIONS TRANSCRIBED FROM CORRESPONDING VERSIONS FROM [facility]  clonazePAM 0.5 mg oral tablet: 1 tab(s) orally once a day (at bedtime)  Crestor 5 mg oral tablet: 1 tab(s) orally once a day  triamterene-hydrochlorothiazide 37.5 mg-25 mg oral capsule: 1 cap(s) orally once a day  Zantac 150 oral tablet: 1 tab(s) orally once a day Aspirin Enteric Coated 81 mg oral delayed release tablet: 1 tab(s) orally once a day-ALL MEDICATIONS TRANSCRIBED FROM CORRESPONDING VERSIONS FROM [facility]  clonazePAM 0.5 mg oral tablet: 1 tab(s) orally once a day (at bedtime)  Crestor 5 mg oral tablet: 1 tab(s) orally once a day  triamterene-hydrochlorothiazide 37.5 mg-25 mg oral capsule: 1 cap(s) orally once a day  Zantac 150 oral tablet: 1 tab(s) orally once a day Aspirin Enteric Coated 81 mg oral delayed release tablet: 1 tab(s) orally once a day  atorvastatin 80 mg oral tablet: 1 tab(s) orally once a day (at bedtime)  clonazePAM 0.5 mg oral tablet: 1 tab(s) orally once a day (at bedtime)  metoprolol tartrate 25 mg oral tablet: 1 tab(s) orally 2 times a day  triamterene-hydrochlorothiazide 37.5 mg-25 mg oral capsule: 1 cap(s) orally once a day  Zantac 150 oral tablet: 1 tab(s) orally once a day

## 2019-10-29 NOTE — DISCHARGE NOTE PROVIDER - HOSPITAL COURSE
58 y/o female with PMHx of HTN, HLD, diastolic CHF and non-obstructive CAD, presented with left sided chest heaviness and Left arm discomfort, admitted to telemetry for rule out ACS. s/p Stress complicated by sustained VT.        1. Ventricular tachycardia.      -had episode of VT during stress test, NSR on tele overnight    -Echo normal LVEF, no significant valvular disease    -Cardiac MRI - Normal left ventricular size and systolic function. No myocardial edema. No delayed enhancement was visualized in the left ventricular myocardium.    -EP consulted - Advised AICD for primary prevention against sudden cardiac arrest but patient states she is not ready for ICD at this time and that her plan is to go to [facility] for her medical care including possible ICD implant.    -Confirmed with patient and daughter the plan- They are in the [place] only 10 days and will returning to  [facility]  and then [facility] to pursue further care.            2. CAD in native artery.      -s/p [facility] with 50% prox Circ disease, iFR negative, plan for medical management    -cont ASA, statin, metoprolol.        3. Essential hypertension.      -cont maxzide.             4. Other elevated white blood cell (WBC) count.      -uptrending wbc unclear etiology. no evidence of infection     -Recommend to repeat blood work in 1-2 weeks or earlier if symptomatic.         5. Hypokalemia.      -Repleted, improved        6. Lymphadenopathy.     -MRI findings of right axillary and subpectoral nodes within upper limits of normal for size, asymmetrically enlarged when compared to the left. These findings are nonspecific.     -Attending discussed findings with pt and daughter- As per pt, she was told of these findings previously. Advised follow up with PMD for imaging/age appropriate cancer screening. Pt also advised to follow up with PMD in 2 weeks to obtain repeat CBC. Daughter trying to obtain PMD in [place] to establish care while in the .         Case discussed with Dr. Terrazas and patient is medically stable for discharge home. Will send medications to [facility] per patient request.

## 2019-10-29 NOTE — PROGRESS NOTE ADULT - REASON FOR ADMISSION
chest pain with left arm discomfort

## 2019-10-29 NOTE — PROGRESS NOTE ADULT - PROBLEM SELECTOR PLAN 6
MRI findins of right axillary and subpectoral nodes within upper limits of normal for   size, asymmetrically enlarged when compared to the left. These findings   are nonspecific. If there are prior outside examinations of the chest for   comparison. In the absence of any outside imaging consider a CT chest for   follow-up in 3 months. MRI findings of right axillary and subpectoral nodes within upper limits of normal for size, asymmetrically enlarged when compared to the left. These findings   are nonspecific. If there are prior outside examinations of the chest for   comparison. In the absence of any outside imaging consider a CT chest for   follow-up in 3 months.  Discussed findings with pt and daughter- As per pt, she was told of these findings previously. Advised FU  with PMD for imaging/age appropriate cancer screening .Pt also advised to FU PMD in 2 weeks to obtain repeat CBC.Daughter trying to obtain PMD in [place] to establish care while in the [place].  Pt and daughter aware and agreeable   Dispo -DC Planning 34mins

## 2019-10-29 NOTE — PROGRESS NOTE ADULT - PROBLEM SELECTOR PLAN 4
uptrending wbc unclear etiology. no e/o infection   -cont to monitor  -infectious w/u- blood cx, UA/urine cx, CXR if febrile
uptrending wbc unclear etiology. no e/o infection   -cont to monitor  -infectious w/u- blood cx, UA/urine cx, CXR if febrile
uptrending wbc unclear etiology. no e/o infection   -cont to monitor  -infectious w/u- blood cx, UA/urine cx, CXR if febrile  Recommend to repeat blood work in 1-2 weeks or earlier if symptomatic

## 2019-10-29 NOTE — PROGRESS NOTE ADULT - PROBLEM SELECTOR PLAN 2
-s/p LHC with 50% prox Circ disease, iFR negative, plan for medical management  -cont asa, statin -s/p LHC with 50% prox Circ disease, iFR negative, plan for medical management  -cont ASA, statin, metoprolol

## 2019-10-29 NOTE — PROGRESS NOTE ADULT - PROVIDER SPECIALTY LIST ADULT
Cardiology
Electrophysiology
Electrophysiology
Hospitalist
Electrophysiology
Hospitalist

## 2019-10-29 NOTE — PROGRESS NOTE ADULT - SUBJECTIVE AND OBJECTIVE BOX
Patient is a 59y old  Female who presents with a chief complaint of chest pain with left arm discomfort (26 Oct 2019 15:11)      SUBJECTIVE / OVERNIGHT EVENTS: Pt seen and examined by me Daughter at bedside    ADDITIONAL REVIEW OF SYSTEMS:    CONSTITUTIONAL: No fever, weight loss, or fatigue  EYES: No eye pain, visual disturbances, or discharge  ENMT:  No difficulty hearing, tinnitus, vertigo; No sinus or throat pain  NECK: No pain or stiffness  RESPIRATORY: No cough, wheezing, chills or hemoptysis; No shortness of breath  CARDIOVASCULAR: No chest pain, palpitations, dizziness, or leg swelling  GASTROINTESTINAL: No abdominal or epigastric pain. No nausea, vomiting, or hematemesis; No diarrhea or constipation. No melena or hematochezia.  GENITOURINARY: No dysuria, frequency, hematuria, or incontinence  NEUROLOGICAL: No headaches, memory loss, loss of strength, numbness, or tremors  SKIN: No itching, burning, rashes, or lesions   MUSCULOSKELETAL: No joint pain or swelling; No muscle, back, or extremity pain  PSYCHIATRIC: No depression, anxiety, mood swings, or difficulty sleeping      MEDICATIONS  (STANDING):  aspirin enteric coated 81 milliGRAM(s) Oral daily  atorvastatin 80 milliGRAM(s) Oral at bedtime  clonazePAM  Tablet 0.5 milliGRAM(s) Oral at bedtime  famotidine    Tablet 20 milliGRAM(s) Oral two times a day  influenza   Vaccine 0.5 milliLiter(s) IntraMuscular once  metoprolol tartrate 25 milliGRAM(s) Oral two times a day  potassium chloride    Tablet ER 40 milliEquivalent(s) Oral once  triamterene 37.5 mG/hydrochlorothiazide 25 mG Tablet 1 Tablet(s) Oral daily    MEDICATIONS  (PRN):  acetaminophen   Tablet .. 650 milliGRAM(s) Oral every 6 hours PRN Mild Pain (1 - 3), Moderate Pain (4 - 6), Severe Pain (7 - 10)    CAPILLARY BLOOD GLUCOSE        PHYSICAL EXAM:  Vital Signs Last 24 Hrs  T(C): 36.6 (27 Oct 2019 05:55), Max: 36.9 (26 Oct 2019 21:45)  T(F): 97.9 (27 Oct 2019 05:55), Max: 98.4 (26 Oct 2019 21:45)  HR: 85 (27 Oct 2019 05:55) (81 - 89)  BP: 120/54 (27 Oct 2019 05:55) (120/54 - 152/62)  BP(mean): --  RR: 18 (27 Oct 2019 05:55) (16 - 18)  SpO2: 99% (27 Oct 2019 05:55) (95% - 99%)    CONSTITUTIONAL: NAD, well-developed, well-groomed  EYES: PERRLA; conjunctiva and sclera clear  ENMT: Moist oral mucosa, no pharyngeal injection or exudates; normal dentition  NECK: Supple, no palpable masses; no thyromegaly  RESPIRATORY: Normal respiratory effort; lungs are clear to auscultation bilaterally  CARDIOVASCULAR: Regular rate and rhythm, normal S1 and S2, no murmur/rub/gallop; No lower extremity edema; Peripheral pulses are 2+ bilaterally  ABDOMEN: Nontender to palpation, normoactive bowel sounds, no rebound/guarding; No hepatosplenomegaly  MUSCLOSKELETAL:  Normal gait; no clubbing or cyanosis of digits; no joint swelling or tenderness to palpation  PSYCH: A+O to person, place, and time; affect appropriate  NEUROLOGY: CN 2-12 are intact and symmetric; no gross sensory deficits;   SKIN: No rashes; no palpable lesions    LABS:                              14.5   13.20 )-----------( 319      ( 29 Oct 2019 06:50 )             43.1       10-29    136  |  96<L>  |  14  ----------------------------<  105<H>  3.6   |  25  |  0.69    Ca    9.8      29 Oct 2019 06:50  Mg     2.0     10-29          RADIOLOGY & ADDITIONAL TESTS:  Results Reviewed:   Imaging Personally Reviewed:  Electrocardiogram Personally Reviewed:    COORDINATION OF CARE:  Care Discussed with Consultants/Other Providers [Y/N]:  Prior or Outpatient Records Reviewed [Y/N]: Patient is a 59y old  Female who presents with a chief complaint of chest pain with left arm discomfort (26 Oct 2019 15:11)      SUBJECTIVE / OVERNIGHT EVENTS: Pt seen and examined by me Daughter at bedside  Reports improvement in epigastric burning after getting the Pepcid  Denies CP/SOB/palpitations  Ambulating in the hallway    ADDITIONAL REVIEW OF SYSTEMS:    CONSTITUTIONAL: No fever, weight loss, or fatigue  EYES: No eye pain, visual disturbances, or discharge  ENMT:  No difficulty hearing, tinnitus, vertigo; No sinus or throat pain  NECK: No pain or stiffness  RESPIRATORY: No cough, wheezing, chills or hemoptysis; No shortness of breath  CARDIOVASCULAR: No chest pain, palpitations, dizziness, or leg swelling  GASTROINTESTINAL: No abdominal or epigastric pain. No nausea, vomiting, or hematemesis; No diarrhea or constipation. No melena or hematochezia.  GENITOURINARY: No dysuria, frequency, hematuria, or incontinence  NEUROLOGICAL: No headaches, memory loss, loss of strength, numbness, or tremors  SKIN: No itching, burning, rashes, or lesions   MUSCULOSKELETAL: No joint pain or swelling; No muscle, back, or extremity pain  PSYCHIATRIC: No depression, anxiety, mood swings, or difficulty sleeping      MEDICATIONS  (STANDING):  aspirin enteric coated 81 milliGRAM(s) Oral daily  atorvastatin 80 milliGRAM(s) Oral at bedtime  clonazePAM  Tablet 0.5 milliGRAM(s) Oral at bedtime  famotidine    Tablet 20 milliGRAM(s) Oral two times a day  influenza   Vaccine 0.5 milliLiter(s) IntraMuscular once  metoprolol tartrate 25 milliGRAM(s) Oral two times a day  potassium chloride    Tablet ER 40 milliEquivalent(s) Oral once  triamterene 37.5 mG/hydrochlorothiazide 25 mG Tablet 1 Tablet(s) Oral daily    MEDICATIONS  (PRN):  acetaminophen   Tablet .. 650 milliGRAM(s) Oral every 6 hours PRN Mild Pain (1 - 3), Moderate Pain (4 - 6), Severe Pain (7 - 10)    CAPILLARY BLOOD GLUCOSE        PHYSICAL EXAM:  Vital Signs Last 24 Hrs  T(C): 36.6 (27 Oct 2019 05:55), Max: 36.9 (26 Oct 2019 21:45)  T(F): 97.9 (27 Oct 2019 05:55), Max: 98.4 (26 Oct 2019 21:45)  HR: 85 (27 Oct 2019 05:55) (81 - 89)  BP: 120/54 (27 Oct 2019 05:55) (120/54 - 152/62)  BP(mean): --  RR: 18 (27 Oct 2019 05:55) (16 - 18)  SpO2: 99% (27 Oct 2019 05:55) (95% - 99%)    CONSTITUTIONAL: NAD, well-developed, well-groomed  EYES: PERRLA; conjunctiva and sclera clear  ENMT: Moist oral mucosa, no pharyngeal injection or exudates; normal dentition  NECK: Supple, no palpable masses; no thyromegaly  RESPIRATORY: Normal respiratory effort; lungs are clear to auscultation bilaterally  CARDIOVASCULAR: Regular rate and rhythm, normal S1 and S2, no murmur/rub/gallop; No lower extremity edema; Peripheral pulses are 2+ bilaterally  ABDOMEN: Nontender to palpation, normoactive bowel sounds, no rebound/guarding; No hepatosplenomegaly  MUSCLOSKELETAL:  Normal gait; no clubbing or cyanosis of digits; no joint swelling or tenderness to palpation  PSYCH: A+O to person, place, and time; affect appropriate  NEUROLOGY: CN 2-12 are intact and symmetric; no gross sensory deficits;   SKIN: No rashes; no palpable lesions    LABS:                              14.5   13.20 )-----------( 319      ( 29 Oct 2019 06:50 )             43.1       10-29    136  |  96<L>  |  14  ----------------------------<  105<H>  3.6   |  25  |  0.69    Ca    9.8      29 Oct 2019 06:50  Mg     2.0     10-29          RADIOLOGY & ADDITIONAL TESTS:  Results Reviewed:   Imaging Personally Reviewed:  Electrocardiogram Personally Reviewed:    COORDINATION OF CARE:  Care Discussed with Consultants/Other Providers :EP NP   Prior or Outpatient Records Reviewed [Y/N]:

## 2019-10-29 NOTE — DISCHARGE NOTE NURSING/CASE MANAGEMENT/SOCIAL WORK - PATIENT PORTAL LINK FT
You can access the FollowMyHealth Patient Portal offered by [facility] by registering at the following website: [email or web address]. By joining [employer]’s FollowMyHealth portal, you will also be able to view your health information using other applications (apps) compatible with our system.

## 2019-10-29 NOTE — CHART NOTE - NSCHARTNOTEFT_GEN_A_CORE
10/29/2019: 59 yr old female admitted for sustained polymorphic VT in the setting of treadmill stress test, significant family history 1st degree relatives of sudden cardiac death x 3 brothers and cardiac arrest with in-hospital death 3 brothers and one sister. Recommendation for ICD for primary prevention against sudden cardiac arrest and pacific  Eliecer # [id number] provided interpretation. In the presence of Alejandra Tatum RN,  ICD procedure including purpose of ICD, risks, benefits, and alternatives to procedure were explained in full detail and patient's questions were answered to her satisfaction. Patient advised that she is not ready for ICD at this time and that her plan is to go to [facility] for her medical care including possible ICD implant. Discussed with Dr. Koffi Albarado. ICD Implant cancelled.     LUCIO LoboC

## 2019-10-29 NOTE — PROGRESS NOTE ADULT - PROBLEM SELECTOR PLAN 1
had episode of VT during stress test, NSR on tele o/n  Echo normal LVEF, no sig valvular disease   Cardiac MRI - Normal left ventricular size and systolic function. No myocardial edema. No delayed enhancement was visualized in the left ventricular   myocardium.  [facility] EP recs had episode of VT during stress test, NSR on tele o/n  Echo normal LVEF, no sig valvular disease   Cardiac MRI - Normal left ventricular size and systolic function. No myocardial edema. No delayed enhancement was visualized in the left ventricular   myocardium.  HONORIO EP - Advised AICD for primary prevention against sudden cardiac arrest but patient states  she is not ready for ICD at this time and that her plan is to go to [facility] for her medical care including possible ICD implant.  I confirmed with patient and daughter the plan- They are in the [place] for only 10 days and will returning to  [facility]  and then [facility] to pursue further care.

## 2019-10-29 NOTE — DISCHARGE NOTE PROVIDER - NSDCCPCAREPLAN_GEN_ALL_CORE_FT
PRINCIPAL DISCHARGE DIAGNOSIS  Diagnosis: Ventricular tachycardia  Assessment and Plan of Treatment: Electrophysiologist recommended placing an AICD for primary prevention against sudden cardiac arrest but you prefer to go to [facility] for your medical care including possible ICD implant.      SECONDARY DISCHARGE DIAGNOSES  Diagnosis: CAD in native artery  Assessment and Plan of Treatment: Cardiac catheterization with 50% proximal Circumflex disease. Medical management with Aspirin, statin, metoprolol.    Diagnosis: Other elevated white blood cell (WBC) count  Assessment and Plan of Treatment: No evidence of infection. Recommend to repeat blood work in 1-2 weeks or earlier if symptomatic.    Diagnosis: Essential hypertension  Assessment and Plan of Treatment: Continue maxzide. Follow up with your primary care physician for further management.    Diagnosis: Hypokalemia  Assessment and Plan of Treatment: Supplemented. Improved.    Diagnosis: Lymphadenopathy  Assessment and Plan of Treatment: MRI findings of right axillary and subpectoral nodes within upper limits of normal for size, asymmetrically enlarged when compared to the left. Follow up with Primary Care Physician for imaging/age appropriate cancer screening. Alos recommend to follow up with Primary Care Physician in 2 weeks to obtain repeat CBC. PRINCIPAL DISCHARGE DIAGNOSIS  Diagnosis: Ventricular tachycardia  Assessment and Plan of Treatment: Electrophysiologist recommended placing an AICD for primary prevention against sudden cardiac arrest but you prefer to go to [facility] for your medical care including possible ICD implant. Continue Metoprolol 25mg twice a day.      SECONDARY DISCHARGE DIAGNOSES  Diagnosis: CAD in native artery  Assessment and Plan of Treatment: Cardiac catheterization with 50% proximal Circumflex disease. Medical management with Aspirin, Atorvastatin, metoprolol.    Diagnosis: Other elevated white blood cell (WBC) count  Assessment and Plan of Treatment: No evidence of infection. Recommend to repeat blood work in 1-2 weeks or earlier if symptomatic.    Diagnosis: Essential hypertension  Assessment and Plan of Treatment: Continue maxzide. Follow up with your primary care physician for further management.    Diagnosis: Hypokalemia  Assessment and Plan of Treatment: Supplemented. Improved.    Diagnosis: Lymphadenopathy  Assessment and Plan of Treatment: MRI findings of right axillary and subpectoral nodes within upper limits of normal for size, asymmetrically enlarged when compared to the left. Follow up with Primary Care Physician for imaging/age appropriate cancer screening. Alos recommend to follow up with Primary Care Physician in 2 weeks to obtain repeat CBC.

## 2019-11-14 NOTE — ED PROVIDER NOTE - CPE EDP RESP NORM
Contacted patient to discuss tomorrow's appointments. Explained to patient that unfortunately the PET CT had been denied.   Advised that we would be switching alternatively to a CT CAP and NM Bone Scan.   Confirmed echo appointment and CT CAP made for tomorrow. Times location and instructions provided.   Confirmed future appt with Dr. Simpson on 11/20.   Explained that bone scan will be scheduled some time next week prior to visit with Dr. Simpson.   Will have to wait to discuss with NM.   She voiced understanding. No further questions/concerns at this time. She expressed gratitude.     
normal...

## 2022-03-31 NOTE — H&P ADULT - PROBLEM/PLAN-3
Detail Level: Zone
Modify Regimen: She will get the Rogaine and nutrafol( if she is taking other supplements she week take two a day  instead of 4)
Continue Regimen: Clobetasol twice a day for two weeks then break for a week then restart. If she gets the Rogaine she will do Clobetasol once a day and Rogaine once a day and then she will do just the Rogaine on her off week from Clobetasol.
DISPLAY PLAN FREE TEXT

## 2023-09-29 NOTE — H&P ADULT - NSHPRISKHIVSCREEN_GEN_ALL_CORE
Is This A New Presentation, Or A Follow-Up?: Skin Lesion
Additional History: Cosmetic removal
Offered and patient declined